# Patient Record
Sex: FEMALE | Race: WHITE | Employment: FULL TIME | URBAN - METROPOLITAN AREA
[De-identification: names, ages, dates, MRNs, and addresses within clinical notes are randomized per-mention and may not be internally consistent; named-entity substitution may affect disease eponyms.]

---

## 2022-01-04 ENCOUNTER — OFFICE VISIT (OUTPATIENT)
Dept: FAMILY MEDICINE CLINIC | Age: 27
End: 2022-01-04
Payer: MEDICARE

## 2022-01-04 ENCOUNTER — HOSPITAL ENCOUNTER (OUTPATIENT)
Age: 27
Discharge: HOME | End: 2022-01-04
Payer: MEDICARE

## 2022-01-04 VITALS
DIASTOLIC BLOOD PRESSURE: 74 MMHG | RESPIRATION RATE: 16 BRPM | HEIGHT: 64 IN | BODY MASS INDEX: 31.58 KG/M2 | TEMPERATURE: 97.9 F | HEART RATE: 77 BPM | SYSTOLIC BLOOD PRESSURE: 116 MMHG | WEIGHT: 185 LBS | OXYGEN SATURATION: 96 %

## 2022-01-04 DIAGNOSIS — Z00.00 ROUTINE PHYSICAL EXAMINATION: ICD-10-CM

## 2022-01-04 DIAGNOSIS — R53.83 FATIGUE, UNSPECIFIED TYPE: ICD-10-CM

## 2022-01-04 DIAGNOSIS — E55.9 VITAMIN D DEFICIENCY: ICD-10-CM

## 2022-01-04 DIAGNOSIS — Z23 FLU VACCINE NEED: ICD-10-CM

## 2022-01-04 DIAGNOSIS — Z59.9 FINANCIAL DIFFICULTIES: Primary | ICD-10-CM

## 2022-01-04 LAB
ALBUMIN SERPL-MCNC: 4.2 G/DL (ref 3.5–5.2)
ALP BLD-CCNC: 67 U/L (ref 35–104)
ALT SERPL-CCNC: 13 U/L (ref 0–32)
ANION GAP SERPL CALCULATED.3IONS-SCNC: 14 MMOL/L (ref 7–16)
AST SERPL-CCNC: 11 U/L (ref 0–31)
BASOPHILS ABSOLUTE: 0.06 E9/L (ref 0–0.2)
BASOPHILS RELATIVE PERCENT: 0.9 % (ref 0–2)
BILIRUB SERPL-MCNC: 0.6 MG/DL (ref 0–1.2)
BUN BLDV-MCNC: 13 MG/DL (ref 6–20)
CALCIUM SERPL-MCNC: 9.7 MG/DL (ref 8.6–10.2)
CHLORIDE BLD-SCNC: 104 MMOL/L (ref 98–107)
CHOLESTEROL, TOTAL: 187 MG/DL (ref 0–199)
CO2: 23 MMOL/L (ref 22–29)
CREAT SERPL-MCNC: 0.7 MG/DL (ref 0.5–1)
EOSINOPHILS ABSOLUTE: 0.78 E9/L (ref 0.05–0.5)
EOSINOPHILS RELATIVE PERCENT: 11.1 % (ref 0–6)
GFR AFRICAN AMERICAN: >60
GFR NON-AFRICAN AMERICAN: >60 ML/MIN/1.73
GLUCOSE BLD-MCNC: 86 MG/DL (ref 74–99)
HBA1C MFR BLD: 5.3 % (ref 4–5.6)
HCT VFR BLD CALC: 40.4 % (ref 34–48)
HDLC SERPL-MCNC: 50 MG/DL
HEMOGLOBIN: 13.2 G/DL (ref 11.5–15.5)
IMMATURE GRANULOCYTES #: 0.02 E9/L
IMMATURE GRANULOCYTES %: 0.3 % (ref 0–5)
LDL CHOLESTEROL CALCULATED: 107 MG/DL (ref 0–99)
LYMPHOCYTES ABSOLUTE: 2.19 E9/L (ref 1.5–4)
LYMPHOCYTES RELATIVE PERCENT: 31.1 % (ref 20–42)
MCH RBC QN AUTO: 28 PG (ref 26–35)
MCHC RBC AUTO-ENTMCNC: 32.7 % (ref 32–34.5)
MCV RBC AUTO: 85.8 FL (ref 80–99.9)
MONOCYTES ABSOLUTE: 0.65 E9/L (ref 0.1–0.95)
MONOCYTES RELATIVE PERCENT: 9.2 % (ref 2–12)
NEUTROPHILS ABSOLUTE: 3.35 E9/L (ref 1.8–7.3)
NEUTROPHILS RELATIVE PERCENT: 47.4 % (ref 43–80)
PDW BLD-RTO: 13.7 FL (ref 11.5–15)
PLATELET # BLD: 340 E9/L (ref 130–450)
PMV BLD AUTO: 10.4 FL (ref 7–12)
POTASSIUM SERPL-SCNC: 4.4 MMOL/L (ref 3.5–5)
RBC # BLD: 4.71 E12/L (ref 3.5–5.5)
SODIUM BLD-SCNC: 141 MMOL/L (ref 132–146)
TOTAL PROTEIN: 7.6 G/DL (ref 6.4–8.3)
TRIGL SERPL-MCNC: 149 MG/DL (ref 0–149)
TSH SERPL DL<=0.05 MIU/L-ACNC: 1.37 UIU/ML (ref 0.27–4.2)
VITAMIN D 25-HYDROXY: 15 NG/ML (ref 30–100)
VLDLC SERPL CALC-MCNC: 30 MG/DL
WBC # BLD: 7.1 E9/L (ref 4.5–11.5)

## 2022-01-04 PROCEDURE — 83036 HEMOGLOBIN GLYCOSYLATED A1C: CPT

## 2022-01-04 PROCEDURE — 80053 COMPREHEN METABOLIC PANEL: CPT

## 2022-01-04 PROCEDURE — 82306 VITAMIN D 25 HYDROXY: CPT

## 2022-01-04 PROCEDURE — 99203 OFFICE O/P NEW LOW 30 MIN: CPT | Performed by: STUDENT IN AN ORGANIZED HEALTH CARE EDUCATION/TRAINING PROGRAM

## 2022-01-04 PROCEDURE — 90674 CCIIV4 VAC NO PRSV 0.5 ML IM: CPT | Performed by: STUDENT IN AN ORGANIZED HEALTH CARE EDUCATION/TRAINING PROGRAM

## 2022-01-04 PROCEDURE — 80061 LIPID PANEL: CPT

## 2022-01-04 PROCEDURE — 85025 COMPLETE CBC W/AUTO DIFF WBC: CPT

## 2022-01-04 PROCEDURE — 84443 ASSAY THYROID STIM HORMONE: CPT

## 2022-01-04 PROCEDURE — 36415 COLL VENOUS BLD VENIPUNCTURE: CPT

## 2022-01-04 PROCEDURE — G0008 ADMIN INFLUENZA VIRUS VAC: HCPCS | Performed by: STUDENT IN AN ORGANIZED HEALTH CARE EDUCATION/TRAINING PROGRAM

## 2022-01-04 SDOH — ECONOMIC STABILITY - INCOME SECURITY: PROBLEM RELATED TO HOUSING AND ECONOMIC CIRCUMSTANCES, UNSPECIFIED: Z59.9

## 2022-01-04 SDOH — ECONOMIC STABILITY: FOOD INSECURITY: WITHIN THE PAST 12 MONTHS, YOU WORRIED THAT YOUR FOOD WOULD RUN OUT BEFORE YOU GOT MONEY TO BUY MORE.: NEVER TRUE

## 2022-01-04 SDOH — ECONOMIC STABILITY: FOOD INSECURITY: WITHIN THE PAST 12 MONTHS, THE FOOD YOU BOUGHT JUST DIDN'T LAST AND YOU DIDN'T HAVE MONEY TO GET MORE.: NEVER TRUE

## 2022-01-04 ASSESSMENT — ENCOUNTER SYMPTOMS
NAUSEA: 0
DIARRHEA: 0
SHORTNESS OF BREATH: 0
CONSTIPATION: 0
EYE PAIN: 0
BACK PAIN: 1
COUGH: 0
RHINORRHEA: 0
SORE THROAT: 0
ABDOMINAL PAIN: 0
VOMITING: 0
EYE REDNESS: 0
SINUS PRESSURE: 0
SINUS PAIN: 0

## 2022-01-04 ASSESSMENT — PATIENT HEALTH QUESTIONNAIRE - PHQ9
SUM OF ALL RESPONSES TO PHQ QUESTIONS 1-9: 12
1. LITTLE INTEREST OR PLEASURE IN DOING THINGS: 1
7. TROUBLE CONCENTRATING ON THINGS, SUCH AS READING THE NEWSPAPER OR WATCHING TELEVISION: 0
SUM OF ALL RESPONSES TO PHQ QUESTIONS 1-9: 12
SUM OF ALL RESPONSES TO PHQ9 QUESTIONS 1 & 2: 3
SUM OF ALL RESPONSES TO PHQ QUESTIONS 1-9: 12
4. FEELING TIRED OR HAVING LITTLE ENERGY: 3
8. MOVING OR SPEAKING SO SLOWLY THAT OTHER PEOPLE COULD HAVE NOTICED. OR THE OPPOSITE, BEING SO FIGETY OR RESTLESS THAT YOU HAVE BEEN MOVING AROUND A LOT MORE THAN USUAL: 0
6. FEELING BAD ABOUT YOURSELF - OR THAT YOU ARE A FAILURE OR HAVE LET YOURSELF OR YOUR FAMILY DOWN: 3
2. FEELING DOWN, DEPRESSED OR HOPELESS: 2
5. POOR APPETITE OR OVEREATING: 3
10. IF YOU CHECKED OFF ANY PROBLEMS, HOW DIFFICULT HAVE THESE PROBLEMS MADE IT FOR YOU TO DO YOUR WORK, TAKE CARE OF THINGS AT HOME, OR GET ALONG WITH OTHER PEOPLE: 1
9. THOUGHTS THAT YOU WOULD BE BETTER OFF DEAD, OR OF HURTING YOURSELF: 0
3. TROUBLE FALLING OR STAYING ASLEEP: 0
SUM OF ALL RESPONSES TO PHQ QUESTIONS 1-9: 12

## 2022-01-04 ASSESSMENT — SOCIAL DETERMINANTS OF HEALTH (SDOH): HOW HARD IS IT FOR YOU TO PAY FOR THE VERY BASICS LIKE FOOD, HOUSING, MEDICAL CARE, AND HEATING?: VERY HARD

## 2022-01-04 NOTE — PROGRESS NOTES
Vaccine Information Sheet, \"Influenza - Inactivated\"  given to Enoc Arroyo, or parent/legal guardian of  Enoc Arroyo and verbalized understanding. Patient responses:    Have you ever had a reaction to a flu vaccine? No  Are you able to eat eggs without adverse effects? Yes  Do you have any current illness? No  Have you ever had Guillian Fisher Syndrome? No    Flu vaccine given per order. Please see immunization tab.

## 2022-01-04 NOTE — PROGRESS NOTES
2022    Kim Clemens (:  1995) is a 23 y.o. 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 Patient is a 72-year-old female here today to establish care with myself. 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 She has a surgical history of a cyst removal from her right breast.  She states it was benign. She states she also had some eye surgery done. She states she was on Celexa in the past for her depression but she declines starting any medications at this time. She states she did not like how it made her feel. However she would like to follow-up with me in a couple weeks to see how she is doing and see if she wants to start anything at that time. Patient does have a 12month-old. Her parents kicked her out of her house for this. She states that they told her that she was responsible. However when she first got pregnant they were more willing to take care of her and the baby. However they stated that since she was unresponsible they wanted custody of the baby. They told her that if they can have the custody then she needed to move out. Says she was homeless for about 2 or 3 weeks. Now she is living in an apartment that her friend has paid 3 months for her. 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 Patient denies CP, SOB, nausea, vomiting, diarrhea, fevers chills sweats, abdominal pain, numbness tingling, dizziness, lightheadedness, ear pain, eye pain, nasal congestion, headaches, blurry vision.      Review of Systems   Constitutional: Negative for chills, fatigue and fever.   HENT: Negative for congestion, ear pain, postnasal drip, rhinorrhea, sinus pressure, sinus pain and sore throat. Eyes: Negative for pain and redness. Respiratory: Negative for cough and shortness of breath. Cardiovascular: Negative for chest pain. Gastrointestinal: Negative for abdominal pain, constipation, diarrhea, nausea and vomiting. Genitourinary: Negative for difficulty urinating and dysuria. Musculoskeletal: Positive for back pain (from epidural patient believes). Negative for myalgias and neck pain. Skin: Negative for rash. Neurological: Negative for dizziness, light-headedness and numbness. Psychiatric/Behavioral: The patient is not nervous/anxious. Prior to Visit Medications    Not on File        No Known Allergies    Past Medical History:   Diagnosis Date    Depression        Past Surgical History:   Procedure Laterality Date    CYST REMOVAL Right 2016    removed from right breast, benign    EYE SURGERY         Social History     Socioeconomic History    Marital status: Single     Spouse name: Not on file    Number of children: Not on file    Years of education: Not on file    Highest education level: Not on file   Occupational History    Not on file   Tobacco Use    Smoking status: Never Smoker    Smokeless tobacco: Never Used   Substance and Sexual Activity    Alcohol use: Yes     Comment: occasionally    Drug use: Never    Sexual activity: Not on file   Other Topics Concern    Not on file   Social History Narrative    Not on file     Social Determinants of Health     Financial Resource Strain: High Risk    Difficulty of Paying Living Expenses: Very hard   Food Insecurity: No Food Insecurity    Worried About Running Out of Food in the Last Year: Never true    Mikie of Food in the Last Year: Never true   Transportation Needs:     Lack of Transportation (Medical): Not on file    Lack of Transportation (Non-Medical):  Not on file   Physical Activity:  Days of Exercise per Week: Not on file    Minutes of Exercise per Session: Not on file   Stress:     Feeling of Stress : Not on file   Social Connections:     Frequency of Communication with Friends and Family: Not on file    Frequency of Social Gatherings with Friends and Family: Not on file    Attends Judaism Services: Not on file    Active Member of [address] [place] [address] [employer] or Organizations: Not on file    Attends Club or Organization Meetings: Not on file    Marital Status: Not on file   Intimate Partner Violence:     Fear of Current or Ex-Partner: Not on file    Emotionally Abused: Not on file    Physically Abused: Not on file    Sexually Abused: Not on file   Housing Stability:     Unable to Pay for Housing in the Last Year: Not on file    Number of [place] in the Last Year: Not on file    Unstable Housing in the Last Year: Not on file        Family History   Problem Relation Age of Onset    Heart Disease Mother     Sleep Apnea Father     Breast Cancer Maternal Grandmother     Cancer Paternal Grandfather            Vitals:    01/04/22 1242   BP: 116/74   Site: Left Upper Arm   Pulse: 77   Resp: 16   Temp: 97.9 °F (36.6 °C)   TempSrc: Temporal   SpO2: 96%   Weight: 185 lb (83.9 kg)   Height: 5' 4.25\" (1.632 m)     Estimated body mass index is 31.51 kg/m² as calculated from the following:    Height as of this encounter: 5' 4.25\" (1.632 m). Weight as of this encounter: 185 lb (83.9 kg). Most Recent Labs  TSH  Lab Results   Component Value Date    TSH 1.424 06/05/2012         Physical Exam  Vitals and nursing note reviewed. Constitutional:       Appearance: Normal appearance. HENT:      Head: Normocephalic and atraumatic. Right Ear: Tympanic membrane, ear canal and external ear normal.      Left Ear: Tympanic membrane, ear canal and external ear normal.      Nose: Nose normal.      Mouth/Throat:      Mouth: Mucous membranes are moist.      Pharynx: Oropharynx is clear.    Eyes: Extraocular Movements: Extraocular movements intact. Conjunctiva/sclera: Conjunctivae normal.      Pupils: Pupils are equal, round, and reactive to light. Cardiovascular:      Rate and Rhythm: Normal rate and regular rhythm. Pulses: Normal pulses. Heart sounds: Normal heart sounds. Pulmonary:      Effort: Pulmonary effort is normal.      Breath sounds: Normal breath sounds. Abdominal:      General: Bowel sounds are normal.      Palpations: Abdomen is soft. Musculoskeletal:         General: Normal range of motion. Cervical back: Normal range of motion and neck supple. Skin:     General: Skin is warm and dry. Capillary Refill: Capillary refill takes less than 2 seconds. Neurological:      General: No focal deficit present. Mental Status: She is alert and oriented to person, place, and time. Psychiatric:         Mood and Affect: Mood normal.         Behavior: Behavior normal.         Thought Content: Thought content normal.         ASSESSMENT/PLAN:  Javier Sorenson was seen today for establish care and annual exam.    Diagnoses and all orders for this visit:    Financial difficulties  -     [facility] Referral to Social Work (Primary Care Only)    Flu vaccine need  -     INFLUENZA, MDCK QUADV, 2 YRS AND OLDER, IM, PF, PREFILL SYR OR SDV, 0.5ML (FLUCELVAX QUADV, PF)    Routine physical examination  -     CBC Auto Differential; Future  -     Comprehensive Metabolic Panel; Future    Vitamin D deficiency  -     Vitamin D 25 Hydroxy; Future    BMI 31.0-31.9,adult  -     Hemoglobin A1C; Future  -     Lipid Panel; Future    Fatigue, unspecified type  -     Hemoglobin A1C; Future  -     TSH without Reflex; Future         As above. Call or go to the nearest ED immediately if symptoms worsen or persist.  Educational materials and/or home exercises printed for patient's review and were included in patient instructions on his/her After Visit Summary and given to patient at the end of visit. Counseled regarding above diagnosis, including possible risks and complications,  especially if left uncontrolled. Counseled regarding the possible side effects, risks, benefits and alternatives to treatment; patient and/or guardian verbalizes understanding, agrees, feels comfortable with and wishes to proceed with above treatment plan. Advised patient to call with any new medication issues, and read all Rx info from pharmacy to assure aware of all possible risks and side effects of medication before taking. Reviewed age and gender appropriate health screening exams and vaccinations. Advised patient regarding importance of keeping up with recommended health maintenance and to schedule as soon as possible if overdue, as this is important in assessing for undiagnosed pathology, especially cancer, as well as protecting against potentially harmful/life threatening disease. Patient and/or guardian verbalizes understanding and agrees with above counseling, assessment and plan. All questions answered. Return in about 4 weeks (around 2/1/2022) for depression. An  electronic signature was used to authenticate this note.     --Janki Laureano,  on 1/4/2022 at 1:13 PM

## 2022-01-05 DIAGNOSIS — E55.9 VITAMIN D DEFICIENCY: Primary | ICD-10-CM

## 2022-01-05 RX ORDER — ERGOCALCIFEROL 1.25 MG/1
50000 CAPSULE ORAL WEEKLY
Qty: 12 CAPSULE | Refills: 1 | Status: SHIPPED | OUTPATIENT
Start: 2022-01-05

## 2022-02-03 ENCOUNTER — TELEPHONE (OUTPATIENT)
Dept: FAMILY MEDICINE CLINIC | Age: 27
End: 2022-02-03

## 2022-02-03 NOTE — TELEPHONE ENCOUNTER
LSW attempted follow up phone call to patient regarding positive SDOH screen for financial difficulties and depression. Left message to return call. Noted previously scheduled 2/2/22 appt for follow up was canceled by patient due to feeling better/no longer needed appointment.

## 2022-04-26 PROCEDURE — 99285 EMERGENCY DEPT VISIT HI MDM: CPT

## 2022-04-26 PROCEDURE — 96375 TX/PRO/DX INJ NEW DRUG ADDON: CPT

## 2022-04-26 PROCEDURE — 96374 THER/PROPH/DIAG INJ IV PUSH: CPT

## 2022-04-27 ENCOUNTER — APPOINTMENT (OUTPATIENT)
Dept: CT IMAGING | Age: 27
End: 2022-04-27
Payer: COMMERCIAL

## 2022-04-27 ENCOUNTER — HOSPITAL ENCOUNTER (EMERGENCY)
Age: 27
Discharge: HOME | End: 2022-04-27
Attending: EMERGENCY MEDICINE
Payer: COMMERCIAL

## 2022-04-27 VITALS
TEMPERATURE: 97.6 F | OXYGEN SATURATION: 99 % | WEIGHT: 185 LBS | SYSTOLIC BLOOD PRESSURE: 110 MMHG | DIASTOLIC BLOOD PRESSURE: 68 MMHG | HEIGHT: 64 IN | BODY MASS INDEX: 31.58 KG/M2 | HEART RATE: 69 BPM | RESPIRATION RATE: 16 BRPM

## 2022-04-27 DIAGNOSIS — R11.2 NON-INTRACTABLE VOMITING WITH NAUSEA, UNSPECIFIED VOMITING TYPE: ICD-10-CM

## 2022-04-27 DIAGNOSIS — R10.84 GENERALIZED ABDOMINAL PAIN: Primary | ICD-10-CM

## 2022-04-27 DIAGNOSIS — R19.7 DIARRHEA, UNSPECIFIED TYPE: ICD-10-CM

## 2022-04-27 LAB
ALBUMIN SERPL-MCNC: 4.1 G/DL (ref 3.5–5.2)
ALP BLD-CCNC: 66 U/L (ref 35–104)
ALT SERPL-CCNC: 12 U/L (ref 0–32)
ANION GAP SERPL CALCULATED.3IONS-SCNC: 13 MMOL/L (ref 7–16)
AST SERPL-CCNC: 14 U/L (ref 0–31)
BACTERIA: ABNORMAL /HPF
BASOPHILS ABSOLUTE: 0.01 E9/L (ref 0–0.2)
BASOPHILS RELATIVE PERCENT: 0.1 % (ref 0–2)
BILIRUB SERPL-MCNC: 1.2 MG/DL (ref 0–1.2)
BILIRUBIN DIRECT: 0.2 MG/DL (ref 0–0.3)
BILIRUBIN URINE: NEGATIVE
BILIRUBIN, INDIRECT: 1 MG/DL (ref 0–1)
BLOOD, URINE: ABNORMAL
BUN BLDV-MCNC: 15 MG/DL (ref 6–20)
CALCIUM SERPL-MCNC: 8.7 MG/DL (ref 8.6–10.2)
CHLORIDE BLD-SCNC: 102 MMOL/L (ref 98–107)
CLARITY: CLEAR
CO2: 19 MMOL/L (ref 22–29)
COLOR: YELLOW
CREAT SERPL-MCNC: 0.6 MG/DL (ref 0.5–1)
EKG ATRIAL RATE: 72 BPM
EKG P AXIS: 52 DEGREES
EKG P-R INTERVAL: 168 MS
EKG Q-T INTERVAL: 392 MS
EKG QRS DURATION: 82 MS
EKG QTC CALCULATION (BAZETT): 429 MS
EKG R AXIS: 39 DEGREES
EKG T AXIS: 34 DEGREES
EKG VENTRICULAR RATE: 72 BPM
EOSINOPHILS ABSOLUTE: 0.29 E9/L (ref 0.05–0.5)
EOSINOPHILS RELATIVE PERCENT: 2.4 % (ref 0–6)
EPITHELIAL CELLS, UA: ABNORMAL /HPF
GFR AFRICAN AMERICAN: >60
GFR NON-AFRICAN AMERICAN: >60 ML/MIN/1.73
GLUCOSE BLD-MCNC: 105 MG/DL (ref 74–99)
GLUCOSE URINE: NEGATIVE MG/DL
HCG, URINE, POC: NEGATIVE
HCT VFR BLD CALC: 42.8 % (ref 34–48)
HEMOGLOBIN: 14.1 G/DL (ref 11.5–15.5)
IMMATURE GRANULOCYTES #: 0.04 E9/L
IMMATURE GRANULOCYTES %: 0.3 % (ref 0–5)
INFLUENZA A BY PCR: NOT DETECTED
INFLUENZA B BY PCR: NOT DETECTED
KETONES, URINE: ABNORMAL MG/DL
LEUKOCYTE ESTERASE, URINE: NEGATIVE
LIPASE: 25 U/L (ref 13–60)
LYMPHOCYTES ABSOLUTE: 0.62 E9/L (ref 1.5–4)
LYMPHOCYTES RELATIVE PERCENT: 5.1 % (ref 20–42)
Lab: NORMAL
MCH RBC QN AUTO: 28.4 PG (ref 26–35)
MCHC RBC AUTO-ENTMCNC: 32.9 % (ref 32–34.5)
MCV RBC AUTO: 86.3 FL (ref 80–99.9)
MONOCYTES ABSOLUTE: 0.48 E9/L (ref 0.1–0.95)
MONOCYTES RELATIVE PERCENT: 3.9 % (ref 2–12)
NEGATIVE QC PASS/FAIL: NORMAL
NEUTROPHILS ABSOLUTE: 10.8 E9/L (ref 1.8–7.3)
NEUTROPHILS RELATIVE PERCENT: 88.2 % (ref 43–80)
NITRITE, URINE: NEGATIVE
PDW BLD-RTO: 13.3 FL (ref 11.5–15)
PH UA: 5 (ref 5–9)
PLATELET # BLD: 334 E9/L (ref 130–450)
PMV BLD AUTO: 10.1 FL (ref 7–12)
POSITIVE QC PASS/FAIL: NORMAL
POTASSIUM REFLEX MAGNESIUM: 3.9 MMOL/L (ref 3.5–5)
PROTEIN UA: NEGATIVE MG/DL
RBC # BLD: 4.96 E12/L (ref 3.5–5.5)
RBC UA: >20 /HPF (ref 0–2)
SARS-COV-2, NAAT: NOT DETECTED
SODIUM BLD-SCNC: 134 MMOL/L (ref 132–146)
SPECIFIC GRAVITY UA: 1.01 (ref 1–1.03)
TOTAL PROTEIN: 7.6 G/DL (ref 6.4–8.3)
UROBILINOGEN, URINE: 0.2 E.U./DL
WBC # BLD: 12.2 E9/L (ref 4.5–11.5)
WBC UA: ABNORMAL /HPF (ref 0–5)

## 2022-04-27 PROCEDURE — 80048 BASIC METABOLIC PNL TOTAL CA: CPT

## 2022-04-27 PROCEDURE — 87088 URINE BACTERIA CULTURE: CPT

## 2022-04-27 PROCEDURE — 81001 URINALYSIS AUTO W/SCOPE: CPT

## 2022-04-27 PROCEDURE — 74177 CT ABD & PELVIS W/CONTRAST: CPT

## 2022-04-27 PROCEDURE — 6370000000 HC RX 637 (ALT 250 FOR IP): Performed by: STUDENT IN AN ORGANIZED HEALTH CARE EDUCATION/TRAINING PROGRAM

## 2022-04-27 PROCEDURE — 80076 HEPATIC FUNCTION PANEL: CPT

## 2022-04-27 PROCEDURE — 6360000004 HC RX CONTRAST MEDICATION: Performed by: RADIOLOGY

## 2022-04-27 PROCEDURE — 96374 THER/PROPH/DIAG INJ IV PUSH: CPT

## 2022-04-27 PROCEDURE — 85025 COMPLETE CBC W/AUTO DIFF WBC: CPT

## 2022-04-27 PROCEDURE — 2580000003 HC RX 258: Performed by: STUDENT IN AN ORGANIZED HEALTH CARE EDUCATION/TRAINING PROGRAM

## 2022-04-27 PROCEDURE — 83690 ASSAY OF LIPASE: CPT

## 2022-04-27 PROCEDURE — 93005 ELECTROCARDIOGRAM TRACING: CPT | Performed by: STUDENT IN AN ORGANIZED HEALTH CARE EDUCATION/TRAINING PROGRAM

## 2022-04-27 PROCEDURE — 36415 COLL VENOUS BLD VENIPUNCTURE: CPT

## 2022-04-27 PROCEDURE — 87635 SARS-COV-2 COVID-19 AMP PRB: CPT

## 2022-04-27 PROCEDURE — 6360000002 HC RX W HCPCS: Performed by: STUDENT IN AN ORGANIZED HEALTH CARE EDUCATION/TRAINING PROGRAM

## 2022-04-27 PROCEDURE — 87502 INFLUENZA DNA AMP PROBE: CPT

## 2022-04-27 PROCEDURE — 96375 TX/PRO/DX INJ NEW DRUG ADDON: CPT

## 2022-04-27 RX ORDER — FENTANYL CITRATE 0.05 MG/ML
50 INJECTION, SOLUTION INTRAMUSCULAR; INTRAVENOUS ONCE
Status: COMPLETED | OUTPATIENT
Start: 2022-04-27 | End: 2022-04-27

## 2022-04-27 RX ORDER — ONDANSETRON 2 MG/ML
4 INJECTION INTRAMUSCULAR; INTRAVENOUS ONCE
Status: COMPLETED | OUTPATIENT
Start: 2022-04-27 | End: 2022-04-27

## 2022-04-27 RX ORDER — 0.9 % SODIUM CHLORIDE 0.9 %
1000 INTRAVENOUS SOLUTION INTRAVENOUS ONCE
Status: COMPLETED | OUTPATIENT
Start: 2022-04-27 | End: 2022-04-27

## 2022-04-27 RX ORDER — AMOXICILLIN AND CLAVULANATE POTASSIUM 875; 125 MG/1; MG/1
1 TABLET, FILM COATED ORAL 2 TIMES DAILY
Qty: 20 TABLET | Refills: 0 | Status: SHIPPED | OUTPATIENT
Start: 2022-04-27 | End: 2022-05-07

## 2022-04-27 RX ORDER — AMOXICILLIN AND CLAVULANATE POTASSIUM 875; 125 MG/1; MG/1
1 TABLET, FILM COATED ORAL ONCE
Status: COMPLETED | OUTPATIENT
Start: 2022-04-27 | End: 2022-04-27

## 2022-04-27 RX ORDER — ONDANSETRON 4 MG/1
4 TABLET, ORALLY DISINTEGRATING ORAL 3 TIMES DAILY PRN
Qty: 21 TABLET | Refills: 0 | Status: SHIPPED | OUTPATIENT
Start: 2022-04-27

## 2022-04-27 RX ADMIN — IOPAMIDOL 80 ML: 755 INJECTION, SOLUTION INTRAVENOUS at 03:43

## 2022-04-27 RX ADMIN — ONDANSETRON 4 MG: 2 INJECTION INTRAMUSCULAR; INTRAVENOUS at 02:42

## 2022-04-27 RX ADMIN — SODIUM CHLORIDE 1000 ML: 9 INJECTION, SOLUTION INTRAVENOUS at 02:41

## 2022-04-27 RX ADMIN — FENTANYL CITRATE 50 MCG: 50 INJECTION INTRAMUSCULAR; INTRAVENOUS at 02:42

## 2022-04-27 RX ADMIN — AMOXICILLIN AND CLAVULANATE POTASSIUM 1 TABLET: 875; 125 TABLET, FILM COATED ORAL at 04:21

## 2022-04-27 ASSESSMENT — ENCOUNTER SYMPTOMS
VOMITING: 1
COUGH: 0
WHEEZING: 0
BACK PAIN: 0
SINUS PRESSURE: 0
ABDOMINAL PAIN: 1
EYE PAIN: 0
ABDOMINAL DISTENTION: 0
EYE DISCHARGE: 0
NAUSEA: 1
SHORTNESS OF BREATH: 0
DIARRHEA: 1
SORE THROAT: 0
EYE REDNESS: 0

## 2022-04-27 ASSESSMENT — PAIN DESCRIPTION - LOCATION
LOCATION: ABDOMEN
LOCATION: ABDOMEN

## 2022-04-27 ASSESSMENT — PAIN SCALES - GENERAL
PAINLEVEL_OUTOF10: 3
PAINLEVEL_OUTOF10: 6

## 2022-04-27 NOTE — ED PROVIDER NOTES
The history is provided by the patient and medical records. 81-year-old female presents emergency department complaint of nausea vomiting diarrhea and abdominal pain. Been going on for approximately 3 days. Is having hard time keeping anything down. Has been taking Zofran last dose was yesterday at 11 AM.  She states she would continue nausea vomiting diarrhea and belly pain trouble to come into the get further work-up and evaluation denies any fevers chills cough congestion runny nose denies any chest pain or shortness of breath denies any changes to urination. Otherwise no other acute complaints    The patient presents with abd pain that has been going on for 3 days. These symptoms are moderate in severity. Symptoms are made better by nothign. Symptoms are made worse by nothign. Associated symptoms include nausea vomiting diarrhea. Review of Systems   Constitutional: Negative for chills and fever. HENT: Negative for ear pain, sinus pressure and sore throat. Eyes: Negative for pain, discharge and redness. Respiratory: Negative for cough, shortness of breath and wheezing. Cardiovascular: Negative for chest pain. Gastrointestinal: Positive for abdominal pain, diarrhea, nausea and vomiting. Negative for abdominal distention. Genitourinary: Negative for dysuria and frequency. Musculoskeletal: Negative for arthralgias and back pain. Skin: Negative for rash and wound. Neurological: Negative for weakness and headaches. Hematological: Negative for adenopathy. All other systems reviewed and are negative. Physical Exam  Vitals and nursing note reviewed. Constitutional:       General: She is not in acute distress. Appearance: Normal appearance. She is normal weight. She is not toxic-appearing. HENT:      Head: Normocephalic and atraumatic. Eyes:      Conjunctiva/sclera: Conjunctivae normal.   Cardiovascular:      Rate and Rhythm: Normal rate and regular rhythm.       Pulses: Normal pulses. Heart sounds: Normal heart sounds. No murmur heard. No gallop. Pulmonary:      Effort: Pulmonary effort is normal. No respiratory distress. Breath sounds: Normal breath sounds. No wheezing or rales. Abdominal:      General: Abdomen is flat. Bowel sounds are normal. There is no distension. Palpations: Abdomen is soft. Tenderness: There is generalized abdominal tenderness. There is no guarding. Skin:     General: Skin is warm and dry. Capillary Refill: Capillary refill takes less than 2 seconds. Coloration: Skin is not jaundiced. Neurological:      General: No focal deficit present. Mental Status: She is alert and oriented to person, place, and time. Psychiatric:         Mood and Affect: Mood normal.         Thought Content: Thought content normal.          Procedures     MDM     60-year-old female presents emerged department with complaints of diarrhea and abdominal pain. Patient's laboratory work-up noted to have slight elevation of the white count otherwise unremarkable urinalysis unremarkable for any signs of infection. Patient's CAT scan showing signs of enteritis. Patient will be started on antibiotics for this. She was given 1 L of fluid as well as nausea medication. She is feeling better and safe to be discharged home at this time. She is agreeable with plan and follow-up with her family doctor. Patient safe for discharge from the emergency department. Did advise on return precautions to the emergency department. Did also advise follow-up with her primary care physician. Patient agreeable with the plan moving forward. EKG: This EKG is signed by emergency department physician. Rate: 72  Rhythm: Sinus  Interpretation: No acute ST elevation depression normal sinus rhythm normal axis intervals appropriate range.   Comparison: no previous EKG available              --------------------------------------------- PAST HISTORY ---------------------------------------------  Past Medical History:  has a past medical history of Depression. Past Surgical History:  has a past surgical history that includes cyst removal (Right, 2016) and eye surgery. Social History:  reports that she has never smoked. She has never used smokeless tobacco. She reports current alcohol use. She reports that she does not use drugs. Family History: family history includes Breast Cancer in her maternal grandmother; Cancer in her paternal grandfather; Heart Disease in her mother; Sleep Apnea in her father. The patients home medications have been reviewed. Allergies: Patient has no known allergies. -------------------------------------------------- RESULTS -------------------------------------------------  Labs:  Results for orders placed or performed during the hospital encounter of 04/27/22   RAPID INFLUENZA A/B ANTIGENS    Specimen: Nasopharyngeal   Result Value Ref Range    Influenza A by PCR Not Detected Not Detected    Influenza B by PCR Not Detected Not Detected   COVID-19, Rapid    Specimen: Nasopharyngeal Swab   Result Value Ref Range    SARS-CoV-2, NAAT Not Detected Not Detected   Culture, Urine    Specimen: Urine, clean catch   Result Value Ref Range    Urine Culture, Routine       10 to 100,000 CFU/mL  Mixed michael isolated. Further workup and sensitivity testing  is not routinely indicated and will not be performed.   Mixed michael isolated includes:  Gram negative rods  Mixed gram positive organisms     CBC with Auto Differential   Result Value Ref Range    WBC 12.2 (H) 4.5 - 11.5 E9/L    RBC 4.96 3.50 - 5.50 E12/L    Hemoglobin 14.1 11.5 - 15.5 g/dL    Hematocrit 42.8 34.0 - 48.0 %    MCV 86.3 80.0 - 99.9 fL    MCH 28.4 26.0 - 35.0 pg    MCHC 32.9 32.0 - 34.5 %    RDW 13.3 11.5 - 15.0 fL    Platelets 771 354 - 661 E9/L    MPV 10.1 7.0 - 12.0 fL    Neutrophils % 88.2 (H) 43.0 - 80.0 %    Immature Granulocytes % 0.3 0.0 - 5.0 % Lymphocytes % 5.1 (L) 20.0 - 42.0 %    Monocytes % 3.9 2.0 - 12.0 %    Eosinophils % 2.4 0.0 - 6.0 %    Basophils % 0.1 0.0 - 2.0 %    Neutrophils Absolute 10.80 (H) 1.80 - 7.30 E9/L    Immature Granulocytes # 0.04 E9/L    Lymphocytes Absolute 0.62 (L) 1.50 - 4.00 E9/L    Monocytes Absolute 0.48 0.10 - 0.95 E9/L    Eosinophils Absolute 0.29 0.05 - 0.50 E9/L    Basophils Absolute 0.01 0.00 - 0.20 L4/C   Basic Metabolic Panel w/ Reflex to MG   Result Value Ref Range    Sodium 134 132 - 146 mmol/L    Potassium reflex Magnesium 3.9 3.5 - 5.0 mmol/L    Chloride 102 98 - 107 mmol/L    CO2 19 (L) 22 - 29 mmol/L    Anion Gap 13 7 - 16 mmol/L    Glucose 105 (H) 74 - 99 mg/dL    BUN 15 6 - 20 mg/dL    CREATININE 0.6 0.5 - 1.0 mg/dL    GFR Non-African American >60 >=60 mL/min/1.73    GFR African American >60     Calcium 8.7 8.6 - 10.2 mg/dL   Hepatic Function Panel   Result Value Ref Range    Total Protein 7.6 6.4 - 8.3 g/dL    Albumin 4.1 3.5 - 5.2 g/dL    Alkaline Phosphatase 66 35 - 104 U/L    ALT 12 0 - 32 U/L    AST 14 0 - 31 U/L    Total Bilirubin 1.2 0.0 - 1.2 mg/dL    Bilirubin, Direct 0.2 0.0 - 0.3 mg/dL    Bilirubin, Indirect 1.0 0.0 - 1.0 mg/dL   Lipase   Result Value Ref Range    Lipase 25 13 - 60 U/L   Urinalysis with Microscopic   Result Value Ref Range    Color, UA Yellow Straw/Yellow    Clarity, UA Clear Clear    Glucose, Ur Negative Negative mg/dL    Bilirubin Urine Negative Negative    Ketones, Urine TRACE (A) Negative mg/dL    Specific Gravity, UA 1.015 1.005 - 1.030    Blood, Urine LARGE (A) Negative    pH, UA 5.0 5.0 - 9.0    Protein, UA Negative Negative mg/dL    Urobilinogen, Urine 0.2 <2.0 E.U./dL    Nitrite, Urine Negative Negative    Leukocyte Esterase, Urine Negative Negative    WBC, UA 0-1 0 - 5 /HPF    RBC, UA >20 0 - 2 /HPF    Epithelial Cells, UA RARE /HPF    Bacteria, UA RARE (A) None Seen /HPF   POC Pregnancy Urine   Result Value Ref Range    HCG, Urine, POC Negative Negative    Lot Number [id number]     Positive QC Pass/Fail Pass     Negative QC Pass/Fail Pass    EKG 12 Lead   Result Value Ref Range    Ventricular Rate 72 BPM    Atrial Rate 72 BPM    P-R Interval 168 ms    QRS Duration 82 ms    Q-T Interval 392 ms    QTc Calculation (Bazett) 429 ms    P Axis 52 degrees    R Axis 39 degrees    T Axis 34 degrees       Radiology:  CT ABDOMEN PELVIS W IV CONTRAST Additional Contrast? None   Final Result   Fluid distention of small bowel loops with some minimal areas of bowel   thickening likely related to enteritis. Mild thickening of the visualized esophagus which could be incidental or due   to esophagitis. No other significant acute abnormality identified. Short-term follow-up   recommended if symptoms persist.      RECOMMENDATIONS:   Unavailable             ------------------------- NURSING NOTES AND VITALS REVIEWED ---------------------------  Date / Time Roomed:  4/27/2022  1:29 AM  ED Bed Assignment:  03/03    The nursing notes within the ED encounter and vital signs as below have been reviewed. /68   Pulse 69   Temp 97.6 °F (36.4 °C) (Infrared)   Resp 16   Ht 5' 4\" (1.626 m)   Wt 185 lb (83.9 kg)   LMP 04/27/2022 (Exact Date)   SpO2 99%   BMI 31.76 kg/m²   Oxygen Saturation Interpretation: Normal      ------------------------------------------ PROGRESS NOTES ------------------------------------------  1:40 AM EDT  I have spoken with the patient and discussed todays results, in addition to providing specific details for the plan of care and counseling regarding the diagnosis and prognosis. Their questions are answered at this time and they are agreeable with the plan. I discussed at length with them reasons for immediate return here for re evaluation.  They will followup with their primary care physician by calling their office on Monday.      --------------------------------- ADDITIONAL PROVIDER NOTES ---------------------------------  At this time the patient is without objective evidence of an acute process requiring hospitalization or inpatient management. They have remained hemodynamically stable throughout their entire ED visit and are stable for discharge with outpatient follow-up. The plan has been discussed in detail and they are aware of the specific conditions for emergent return, as well as the importance of follow-up. Discharge Medication List as of 4/27/2022  5:00 AM      START taking these medications    Details   amoxicillin-clavulanate (AUGMENTIN) 875-125 MG per tablet Take 1 tablet by mouth 2 times daily for 10 days, Disp-20 tablet, R-0Normal             Diagnosis:  1. Generalized abdominal pain    2. Non-intractable vomiting with nausea, unspecified vomiting type    3. Diarrhea, unspecified type        Disposition:  Patient's disposition: Discharge to home  Patient's condition is stable. Avelina House MD  Resident  04/27/22 [id number]      ATTENDING PROVIDER ATTESTATION:     Re Shell presented to the emergency department for evaluation of No chief complaint on file. and was initially evaluated by the Medical Resident. See Original ED Note for H&P and ED course above. I have reviewed and discussed the case, including pertinent history (medical, surgical, family and social) and exam findings with the Medical Resident assigned to Buckyshawn Suleman. I have personally performed and/or participated in the history, exam, medical decision making, and procedures and agree with all pertinent clinical information. I have reviewed my findings and recommendations with the assigned Medical Resident, Re Shell and members of family present at the time of disposition. My findings/plan: The primary encounter diagnosis was Generalized abdominal pain. Diagnoses of Non-intractable vomiting with nausea, unspecified vomiting type and Diarrhea, unspecified type were also pertinent to this visit.   Discharge Medication List as of 4/27/2022 5:00 AM      START taking these medications    Details   amoxicillin-clavulanate (AUGMENTIN) 875-125 MG per tablet Take 1 tablet by mouth 2 times daily for 10 days, Disp-20 tablet, R-0Normal           DO Ky Sorto [place], [place]  06/08/22 0432

## 2022-04-28 LAB — URINE CULTURE, ROUTINE: NORMAL

## 2022-05-02 ENCOUNTER — TELEPHONE (OUTPATIENT)
Dept: FAMILY MEDICINE CLINIC | Age: 27
End: 2022-05-02

## 2022-05-02 NOTE — TELEPHONE ENCOUNTER
LSW attempted phone call to patient. Had noted patient was treated in ED 4/27/22 for enteritis with discharge instructions to schedule follow up with PCP.    Left message reminding patient to schedule ED follow up with PCP and to call LSW back regarding previous referral.

## 2022-05-19 ENCOUNTER — OFFICE VISIT (OUTPATIENT)
Dept: FAMILY MEDICINE CLINIC | Age: 27
End: 2022-05-19
Payer: COMMERCIAL

## 2022-05-19 VITALS
WEIGHT: 185 LBS | HEIGHT: 64 IN | DIASTOLIC BLOOD PRESSURE: 60 MMHG | BODY MASS INDEX: 31.58 KG/M2 | OXYGEN SATURATION: 98 % | RESPIRATION RATE: 16 BRPM | TEMPERATURE: 98.7 F | HEART RATE: 68 BPM | SYSTOLIC BLOOD PRESSURE: 102 MMHG

## 2022-05-19 DIAGNOSIS — R43.2 ALTERED TASTE: ICD-10-CM

## 2022-05-19 DIAGNOSIS — Z20.822 CLOSE EXPOSURE TO COVID-19 VIRUS: ICD-10-CM

## 2022-05-19 DIAGNOSIS — B34.9 VIRAL ILLNESS: Primary | ICD-10-CM

## 2022-05-19 DIAGNOSIS — R50.9 FEVER, UNSPECIFIED FEVER CAUSE: ICD-10-CM

## 2022-05-19 LAB
INFLUENZA A ANTIBODY: NORMAL
INFLUENZA B ANTIBODY: NORMAL
Lab: NORMAL
QC PASS/FAIL: NORMAL
SARS-COV-2 RDRP RESP QL NAA+PROBE: NEGATIVE

## 2022-05-19 PROCEDURE — 87635 SARS-COV-2 COVID-19 AMP PRB: CPT

## 2022-05-19 PROCEDURE — 87804 INFLUENZA ASSAY W/OPTIC: CPT

## 2022-05-19 PROCEDURE — 99213 OFFICE O/P EST LOW 20 MIN: CPT

## 2022-05-19 RX ORDER — METHYLPREDNISOLONE 4 MG/1
TABLET ORAL
Qty: 1 KIT | Refills: 0 | Status: SHIPPED
Start: 2022-05-19 | End: 2022-10-12 | Stop reason: ALTCHOICE

## 2022-05-19 NOTE — PROGRESS NOTES
Chief Complaint       Fever (Symptoms started yesterday. She took a home covid test but it come back inconclusive. ), Pharyngitis, Taste Change, Other (Had exposure to covid positive person at her school on Tuesday. ), and Headache    History of Present Illness   Source of history provided by:  patient. Mickey Starr is a 33 y.o. old female presenting to the walk in clinic for evaluation of fever (Tmax 103F), sore throat, altered taste and smell, headache, and cough x 1 day. She took at home test for COVID-19 that was \"inconclusive. \" Denies any chills, body aches, CP, dyspnea, LE edema, abdominal pain, vomiting, rash, or lethargy. Denies any hx of asthma or COPD. Patient has recent sick exposures, exposed to [place]- 19 at work 2 days ago. Patient has been vaccinated for COVID-19. Has been taking Tylenol OTC. ROS    Unless otherwise stated in this report or unable to obtain because of the patient's clinical or mental status as evidenced by the medical record, this patients's positive and negative responses for Review of Systems, constitutional, psych, eyes, ENT, cardiovascular, respiratory, gastrointestinal, neurological, genitourinary, musculoskeletal, integument systems and systems related to the presenting problem are either stated in the preceding or were not pertinent or were negative for the symptoms and/or complaints related to the medical problem. Physical Exam         VS:  /60   Pulse 68   Temp 98.7 °F (37.1 °C) (Temporal)   Resp 16   Ht 5' 4\" (1.626 m)   Wt 185 lb (83.9 kg)   LMP 04/27/2022 (Exact Date)   SpO2 98%   BMI 31.76 kg/m²    Oxygen Saturation Interpretation: Normal.    Constitutional:  Alert, development consistent with age. NAD. Head:  NC/NT. Airway patent. Mouth: Posterior pharynx with mild erythema and clear postnasal drip. No tonsillar hypertrophy or exudate. Neck:  Normal ROM. Supple. No anterior cervical adenopathy noted.   Lungs: CTAB without wheezes, rales, or rhonchi. CV:  Regular rate and rhythm, normal heart sounds, without pathological murmurs, ectopy, gallops, or rubs. Skin:  Normal turgor. Warm, dry, without visible rash. Lymphatic: No lymphangitis or adenopathy noted. Neurological:  Oriented. Motor functions intact. Lab / Imaging Results   (All laboratory and radiology results have been personally reviewed by myself)  Labs:  No results found for this visit on 05/19/22. Imaging: All Radiology results interpreted by Radiologist unless otherwise noted. Assessment / Plan     Impression(s):  Jorge Lacey was seen today for fever, pharyngitis, taste change, other and headache. Diagnoses and all orders for this visit:    Viral illness  -     methylPREDNISolone (MEDROL DOSEPACK) 4 MG tablet; Take by mouth. Fever, unspecified fever cause  -     POCT COVID-19 Rapid, NAAT  -     POCT Influenza A/B    Altered taste  -     POCT COVID-19 Rapid, NAAT    Close exposure to COVID-19 virus  -     POCT COVID-19 Rapid, NAAT      Disposition:  Disposition: Discharge to home. Rapid COVID-19 testing is negative in office. Increase fluids and rest. Symptomatic relief discussed including Tylenol prn pain/fever. Schedule f/u with PCP in 7-10 days if symptoms persist. ED sooner if symptoms worsen or change. ED immediately with high or refractory fever, progressive SOB, dyspnea, CP, calf pain/swelling, shaking chills, vomiting, abdominal pain, lethargy, flank pain, or decreased urinary output. Pt verbalizes understanding and is in agreement with plan of care. All questions answered. JENNIFFER Rios    **This report was transcribed using voice recognition software. Every effort was made to ensure accuracy; however, inadvertent computerized transcription errors may be present.

## 2022-05-19 NOTE — LETTER
[facility] In  [address] [place], [place] [id number]  Phone: [phone]  Fax: [phone]    Ashley Garcia, [address]        May 19, 2022     Patient: Aron Crain   YOB: 1995   Date of Visit: 5/19/2022       To Whom it May Concern:    Vale Downs was seen in my clinic on 5/19/2022. She may return to school on 5/23. If you have any questions or concerns, please don't hesitate to call.     Sincerely,         JENNIFFER Adkins

## 2022-07-27 ENCOUNTER — OFFICE VISIT (OUTPATIENT)
Dept: PODIATRY | Age: 27
End: 2022-07-27
Payer: COMMERCIAL

## 2022-07-27 VITALS — BODY MASS INDEX: 31.58 KG/M2 | HEIGHT: 64 IN | WEIGHT: 185 LBS

## 2022-07-27 DIAGNOSIS — L60.0 OC (ONYCHOCRYPTOSIS): Primary | ICD-10-CM

## 2022-07-27 DIAGNOSIS — M79.675 PAIN OF TOE OF LEFT FOOT: ICD-10-CM

## 2022-07-27 PROCEDURE — G8427 DOCREV CUR MEDS BY ELIG CLIN: HCPCS | Performed by: PODIATRIST

## 2022-07-27 PROCEDURE — 1036F TOBACCO NON-USER: CPT | Performed by: PODIATRIST

## 2022-07-27 PROCEDURE — G8417 CALC BMI ABV UP PARAM F/U: HCPCS | Performed by: PODIATRIST

## 2022-07-27 PROCEDURE — 11750 EXCISION NAIL&NAIL MATRIX: CPT | Performed by: PODIATRIST

## 2022-07-27 PROCEDURE — 99203 OFFICE O/P NEW LOW 30 MIN: CPT | Performed by: PODIATRIST

## 2022-07-27 NOTE — PROGRESS NOTES
Patient is in today for evaluation of possible ingrown toenail to the left great toe.  Pcp is Priscilla Tarango,   Last ov 1/4/22

## 2022-07-27 NOTE — PROGRESS NOTES
22     Melecio Schmidt    : 1995 Sex: female   Age: 33 y.o. Patient was referred by: None  Patient's PCP/Provider is: Priscilla Tarango DO    Subjective:    Patient seen today for evaluation regarding chronic ingrown nail left great toe. Chief Complaint   Patient presents with    Ingrown Toenail     Left great toe        HPI: Patient stated issues are causing discomfort with certain activities and shoe gear on a daily basis. She has had matrix procedures by another area physician over the last several years. She denies any nausea, vomiting, fever, chills. No other additional abnormalities noted. ROS:  Const: Positives and pertinent negatives as per HPI. Musculo: Denies symptoms other than stated above. Neuro: Denies symptoms other than stated above. Skin: Denies symptoms other than stated above.     Current Medications:    Current Outpatient Medications:     methylPREDNISolone (MEDROL DOSEPACK) 4 MG tablet, Take by mouth., Disp: 1 kit, Rfl: 0    ondansetron (ZOFRAN-ODT) 4 MG disintegrating tablet, Take 1 tablet by mouth 3 times daily as needed for Nausea or Vomiting, Disp: 21 tablet, Rfl: 0    vitamin D (ERGOCALCIFEROL) 1.25 MG (50620 UT) CAPS capsule, Take 1 capsule by mouth once a week (Patient not taking: Reported on 2022), Disp: 12 capsule, Rfl: 1    Allergies:  No Known Allergies    Vitals:    22 1328   Weight: 185 lb (83.9 kg)   Height: 5' 4\" (1.626 m)        Past Medical History:   Diagnosis Date    Depression      Family History   Problem Relation Age of Onset    Heart Disease Mother     Sleep Apnea Father     Breast Cancer Maternal Grandmother     Cancer Paternal Grandfather      Past Surgical History:   Procedure Laterality Date    CYST REMOVAL Right 2016    removed from right breast, benign    EYE SURGERY       Social History     Tobacco Use    Smoking status: Never    Smokeless tobacco: Never   Substance Use Topics    Alcohol use: Yes     Comment: occasionally Drug use: Never           Diagnostic studies:    No results found. Procedures:    Matrixectomy phenol alcohol    I discussed with the patient the procedure in extensive detail. We discussed the potential for recurrence, infection, prolonged drainage, delayed healing, overcorrection, undercorrection, recurrence of the deformity and potential need for further treatment. The patient understood. All questions were answered and all concerns were addressed. No guarantees were given. Upon receiving consent, the patient was brought to the treatment room and placed in the supine position where local anesthesia was achieved. The local anesthesia consisted of 4 cc of 2% Xylocaine plain to the left great toe. The left great toe was prepped and draped in the usual fashion. Hemostasis was achieved via Penrose drain. At this point in time, a partial matrixectomy was performed along the lateral aspect of the left great toe. A spatula was used to free the lateral border of the left great toenail. Next, an English anvil was used to transect the nail border to the eponychium. Next, a 58 blade was utilized to transect the nail border proximal to the eponychium. The nail border was removed utilizing straight hemostats. Three 30 second applications of phenol were utilized to perform a chemical matrixectomy. The area was flushed with alcohol and dressed with antibiotic/Adaptic, 4x4, Aaron, and Coban in a compressive fashion. The tourniquet was released and normal vascular capillary fill time was noted to return to the end of the digit. The patient tolerated the procedure and anesthesia well and left the office with vital signs stable and vascular status intact. The patient was given home going instructions and will reappoint for postperative follow up. Exam:  VASCULAR: Pedal pulses palpable left foot.   CFT brisk digits 1 through 5 left foot  NEUROLOGICAL: Epicritic sensations intact digital regions left foot  DERMATOLOGICAL: Ingrown issue noted medial border left great toe. No signs of infection noted left great toe. MUSCULOSKELETAL: Noncontributory    Plan Per Assessment  Fernie Quiñones was seen today for ingrown toenail. Diagnoses and all orders for this visit:    OC (onychocryptosis)    Pain of toe of left foot        New patient evaluation and management  Partial matrix procedure performed left great toe medial border as described above to patient tolerance. Patient was given home-going instructions to be performed on a daily basis to the matrix site left great toe. Patient will be followed up at a later date for continued evaluation and care. Seen By:    Elio Jefferson DPM    Electronically signed by Elio Jefferson DPM on 7/27/2022 at 2:09 PM      This note was created using voice recognition software. The note was reviewed however may contain grammatical errors.

## 2022-07-28 ENCOUNTER — TELEPHONE (OUTPATIENT)
Dept: PODIATRY | Age: 27
End: 2022-07-28

## 2022-07-28 DIAGNOSIS — L60.0 OC (ONYCHOCRYPTOSIS): Primary | ICD-10-CM

## 2022-07-28 RX ORDER — DOXYCYCLINE HYCLATE 100 MG
100 TABLET ORAL 2 TIMES DAILY
Qty: 28 TABLET | Refills: 0 | Status: SHIPPED | OUTPATIENT
Start: 2022-07-28 | End: 2022-08-11

## 2022-07-28 NOTE — TELEPHONE ENCOUNTER
Patient called in stating that when she removed the bandage on her toe this morning, there was an excessive amount of drainage that was yellow/green. Patient also said there was pus present. She did develop a low-grade fever that has increased since the morning. Please advise.

## 2022-08-11 ENCOUNTER — TELEPHONE (OUTPATIENT)
Dept: ADMINISTRATIVE | Age: 27
End: 2022-08-11

## 2022-08-11 NOTE — TELEPHONE ENCOUNTER
Patient got called into work today and cancelled her POST OP visit. Can you please r/s the appt? The next opening was not until 8/30 and I thought the POST OP probably needed seen before then.

## 2022-09-15 ENCOUNTER — OFFICE VISIT (OUTPATIENT)
Dept: FAMILY MEDICINE CLINIC | Age: 27
End: 2022-09-15
Payer: COMMERCIAL

## 2022-09-15 VITALS
BODY MASS INDEX: 31.58 KG/M2 | WEIGHT: 185 LBS | TEMPERATURE: 97.6 F | DIASTOLIC BLOOD PRESSURE: 76 MMHG | RESPIRATION RATE: 18 BRPM | SYSTOLIC BLOOD PRESSURE: 110 MMHG | HEART RATE: 70 BPM | OXYGEN SATURATION: 99 % | HEIGHT: 64 IN

## 2022-09-15 DIAGNOSIS — J02.9 ACUTE PHARYNGITIS, UNSPECIFIED ETIOLOGY: Primary | ICD-10-CM

## 2022-09-15 DIAGNOSIS — B34.9 VIRAL ILLNESS: ICD-10-CM

## 2022-09-15 LAB
Lab: NORMAL
PERFORMING INSTRUMENT: NORMAL
QC PASS/FAIL: NORMAL
S PYO AG THROAT QL: NORMAL
SARS-COV-2, POC: NORMAL

## 2022-09-15 PROCEDURE — 87880 STREP A ASSAY W/OPTIC: CPT

## 2022-09-15 PROCEDURE — 87426 SARSCOV CORONAVIRUS AG IA: CPT

## 2022-09-15 PROCEDURE — 99213 OFFICE O/P EST LOW 20 MIN: CPT

## 2022-09-15 NOTE — LETTER
[facility] In  [address] [place] [address]  Phone: [phone]  Fax: [phone]    Gita Ruth        September 15, 2022     Patient: Aleksey Avelar   YOB: 1995   Date of Visit: 9/15/2022       To Whom It May Concern: It is my medical opinion that Cesar Sosa may return to work on 9/16, tested negative for COVID-19 and strep in office today. If you have any questions or concerns, please don't hesitate to call.     Sincerely,        JENNIFFER Ruth

## 2022-09-15 NOTE — PROGRESS NOTES
breath sounds equal.    CV: Regular rate and rhythm, normal heart sounds, without pathological murmurs, ectopy, gallops, or rubs. Skin:  No rashes, erythema present. Lymphatics: No lymphangitis or adenopathy noted other then stated above. Neurological:  Alert and orientated. Motor functions intact. Responds to commands. Lab / Imaging Results   (All laboratory and radiology results have been personally reviewed by myself)  Labs:  Results for orders placed or performed in visit on 09/15/22   POCT COVID-19, Antigen   Result Value Ref Range    SARS-COV-2, POC Not-Detected Not Detected    Lot Number [id number]     QC Pass/Fail pass     Performing Instrument BD Veritor    POCT rapid strep A   Result Value Ref Range    Strep A Ag None Detected None Detected       Imaging: All Radiology results interpreted by Radiologist unless otherwise noted. Assessment / Plan     Impression(s):  G[place] was seen today for fever. Diagnoses and all orders for this visit:    Acute pharyngitis, unspecified etiology  -     POCT COVID-19, Antigen  -     POCT rapid strep A    Viral illness  -     POCT COVID-19, Antigen  -     POCT rapid strep A    Disposition:  Disposition: Discharge to home. Rapid strep came back negative. Pt advised that her illness is likely viral and should resolve with time and conservative measures. Increase fluids and rest. NSAIDs prn pain/fever. F/u PCP in 5-7 days if symptoms persist. ED sooner if symptoms worsen or change. ED immediately with the development of refractory fever, shaking chills, dyspnea, dysphagia, neck stiffness, vomiting, etc. Pt is in agreement with this care plan. All questions answered. JENNIFFER Hunt    **This report was transcribed using voice recognition software. Every effort was made to ensure accuracy; however, inadvertent computerized transcription errors may be present.

## 2022-09-19 ENCOUNTER — OFFICE VISIT (OUTPATIENT)
Dept: FAMILY MEDICINE CLINIC | Age: 27
End: 2022-09-19
Payer: COMMERCIAL

## 2022-09-19 VITALS
TEMPERATURE: 98 F | OXYGEN SATURATION: 99 % | RESPIRATION RATE: 16 BRPM | WEIGHT: 189 LBS | HEART RATE: 69 BPM | SYSTOLIC BLOOD PRESSURE: 114 MMHG | HEIGHT: 64 IN | DIASTOLIC BLOOD PRESSURE: 70 MMHG | BODY MASS INDEX: 32.27 KG/M2

## 2022-09-19 DIAGNOSIS — N39.0 URINARY TRACT INFECTION WITH HEMATURIA, SITE UNSPECIFIED: ICD-10-CM

## 2022-09-19 DIAGNOSIS — R10.9 LEFT FLANK PAIN: ICD-10-CM

## 2022-09-19 DIAGNOSIS — R19.7 DIARRHEA, UNSPECIFIED TYPE: ICD-10-CM

## 2022-09-19 DIAGNOSIS — R31.9 URINARY TRACT INFECTION WITH HEMATURIA, SITE UNSPECIFIED: ICD-10-CM

## 2022-09-19 DIAGNOSIS — R50.9 FEVER, UNSPECIFIED FEVER CAUSE: Primary | ICD-10-CM

## 2022-09-19 LAB
BILIRUBIN, POC: ABNORMAL
BLOOD URINE, POC: ABNORMAL
CLARITY, POC: CLEAR
COLOR, POC: YELLOW
CONTROL: NORMAL
GLUCOSE URINE, POC: ABNORMAL
KETONES, POC: ABNORMAL
LEUKOCYTE EST, POC: ABNORMAL
Lab: NORMAL
NITRITE, POC: ABNORMAL
PH, POC: 5.5
PREGNANCY TEST URINE, POC: NORMAL
PROTEIN, POC: ABNORMAL
QC PASS/FAIL: NORMAL
SARS-COV-2 RDRP RESP QL NAA+PROBE: NEGATIVE
SPECIFIC GRAVITY, POC: >1.03
UROBILINOGEN, POC: 0.2

## 2022-09-19 PROCEDURE — 1036F TOBACCO NON-USER: CPT | Performed by: NURSE PRACTITIONER

## 2022-09-19 PROCEDURE — 87635 SARS-COV-2 COVID-19 AMP PRB: CPT | Performed by: NURSE PRACTITIONER

## 2022-09-19 PROCEDURE — G8427 DOCREV CUR MEDS BY ELIG CLIN: HCPCS | Performed by: NURSE PRACTITIONER

## 2022-09-19 PROCEDURE — 99214 OFFICE O/P EST MOD 30 MIN: CPT | Performed by: NURSE PRACTITIONER

## 2022-09-19 PROCEDURE — 81025 URINE PREGNANCY TEST: CPT | Performed by: NURSE PRACTITIONER

## 2022-09-19 PROCEDURE — 81002 URINALYSIS NONAUTO W/O SCOPE: CPT | Performed by: NURSE PRACTITIONER

## 2022-09-19 PROCEDURE — G8417 CALC BMI ABV UP PARAM F/U: HCPCS | Performed by: NURSE PRACTITIONER

## 2022-09-19 RX ORDER — CEFDINIR 300 MG/1
300 CAPSULE ORAL 2 TIMES DAILY
Qty: 14 CAPSULE | Refills: 0 | Status: SHIPPED
Start: 2022-09-19 | End: 2022-09-22 | Stop reason: ALTCHOICE

## 2022-09-19 NOTE — LETTER
[facility] In  [address] [place] [address]  Phone: [phone]  Fax: [phone] - KIM        September 19, 2022      Israel Henson was seen and treated at [facility] today. Please excuse from work / school. If there are any questions or concerns please contact the office.       Sincerely,        SONIA Johnston CNP

## 2022-09-19 NOTE — PROGRESS NOTES
22  Kelsea Herndon : 1995 Sex: female  Age 33 y.o. Subjective:  Chief Complaint   Patient presents with    Flank Pain     Left side, started this morning. Diarrhea     Started yesterday morning. Fever     Has had on and off since Thursday. She was sent home from work today with a temp of 100.4. She took tylenol about 45 minutes ago. HPI:   Kelsea Herndon , 33 y.o. female presents to the clinic for evaluation of intermittent fever x 6 days. The patient also reports left flank pain, dysuria, intermittent abdominal cramping, nausea, and diarrhea. The paint was seen on 9/15/22 for fever. The patient has taken Tylenol for symptoms. The patient reports unchanged symptoms over time. The patient reports COVID-19 ill exposure. The patient denies hx of COVID-19. The patient denies acute loss of taste and smell, hematuria, pregnancy, headache, sinus congestion, cough, sore throat, and rash. The patient also denies chest pain, abdominal pain, shortness of breath, and nausea / vomiting. ROS:   Unless otherwise stated in this report the patient's positive and negative responses for review of systems for constitutional, eyes, ENT, cardiovascular, respiratory, gastrointestinal, neurological, , musculoskeletal, and integument systems and related systems to the presenting problem are either stated in the history of present illness or were not pertinent or were negative for the symptoms and/or complaints related to the presenting medical problem. Positives and pertinent negatives as per HPI. All others reviewed and are negative.       PMH:     Past Medical History:   Diagnosis Date    Depression        Past Surgical History:   Procedure Laterality Date    CYST REMOVAL Right     removed from right breast, benign    EYE SURGERY         Family History   Problem Relation Age of Onset    Heart Disease Mother     Sleep Apnea Father     Breast Cancer Maternal Grandmother     Cancer Paternal Grandfather        Medications:     Current Outpatient Medications:     cefdinir (OMNICEF) 300 MG capsule, Take 1 capsule by mouth 2 times daily for 7 days, Disp: 14 capsule, Rfl: 0    methylPREDNISolone (MEDROL DOSEPACK) 4 MG tablet, Take by mouth., Disp: 1 kit, Rfl: 0    ondansetron (ZOFRAN-ODT) 4 MG disintegrating tablet, Take 1 tablet by mouth 3 times daily as needed for Nausea or Vomiting, Disp: 21 tablet, Rfl: 0    vitamin D (ERGOCALCIFEROL) 1.25 MG (46422 UT) CAPS capsule, Take 1 capsule by mouth once a week (Patient not taking: Reported on 5/19/2022), Disp: 12 capsule, Rfl: 1    Allergies:   No Known Allergies    Social History:     Social History     Tobacco Use    Smoking status: Never    Smokeless tobacco: Never   Substance Use Topics    Alcohol use: Yes     Comment: occasionally    Drug use: Never       Physical Exam:     Vitals:    09/19/22 1200   BP: 114/70   Pulse: 69   Resp: 16   Temp: 98 °F (36.7 °C)   TempSrc: Temporal   SpO2: 99%   Weight: 189 lb (85.7 kg)   Height: 5' 4\" (1.626 m)       Physical Exam (PE)    Physical Exam  Constitutional:       Appearance: Normal appearance. HENT:      Head: Normocephalic. Right Ear: Tympanic membrane, ear canal and external ear normal.      Left Ear: Tympanic membrane, ear canal and external ear normal.      Nose: Nose normal.      Mouth/Throat:      Mouth: Mucous membranes are moist.      Pharynx: Oropharynx is clear. Eyes:      Pupils: Pupils are equal, round, and reactive to light. Cardiovascular:      Rate and Rhythm: Normal rate and regular rhythm. Pulses: Normal pulses. Heart sounds: Normal heart sounds. Pulmonary:      Effort: Pulmonary effort is normal.      Breath sounds: Normal breath sounds. Abdominal:      General: Bowel sounds are normal. There is no distension. Palpations: Abdomen is soft. Tenderness: There is abdominal tenderness. There is left CVA tenderness.  There is no right CVA tenderness, guarding or rebound. Musculoskeletal:         General: Normal range of motion. Cervical back: Normal range of motion and neck supple. Lymphadenopathy:      Cervical: No cervical adenopathy. Skin:     General: Skin is warm and dry. Capillary Refill: Capillary refill takes less than 2 seconds. Neurological:      General: No focal deficit present. Mental Status: She is alert and oriented to person, place, and time. Psychiatric:         Mood and Affect: Mood normal.         Behavior: Behavior normal.        Testing:   (All laboratory and radiology results have been personally reviewed by myself)  Labs:  Results for orders placed or performed in visit on 09/19/22   POCT Urinalysis no Micro   Result Value Ref Range    Color, UA yellow     Clarity, UA clear     Glucose, UA POC neg     Bilirubin, UA neg     Ketones, UA neg     Spec Grav, UA >1.030     Blood, UA POC mod     pH, UA 5.5     Protein, UA POC neg     Urobilinogen, UA 0.2     Leukocytes, UA trace     Nitrite, UA neg    POCT urine pregnancy   Result Value Ref Range    Preg Test, Ur Neg     Control OK    POCT COVID-19 Rapid, NAAT   Result Value Ref Range    SARS-COV-2, RdRp gene Negative Negative    Lot Number [id number]     QC Pass/Fail pass        Imaging: All Radiology results interpreted by Radiologist unless otherwise noted. XR ABDOMEN (KUB) (SINGLE AP VIEW)    (Results Pending)       Assessment / Plan:   The patient's vitals, allergies, medications, and past medical history have been reviewed. Valerie Arroyo was seen today for flank pain, diarrhea and fever. Diagnoses and all orders for this visit:    Fever, unspecified fever cause  -     POCT Urinalysis no Micro  -     POCT COVID-19 Rapid, NAAT  -     XR ABDOMEN (KUB) (SINGLE AP VIEW); Future    Urinary tract infection with hematuria, site unspecified  -     Culture, Urine; Future  -     XR ABDOMEN (KUB) (SINGLE AP VIEW); Future  -     cefdinir (OMNICEF) 300 MG capsule;  Take 1 capsule by mouth 2 times daily for 7 days    Left flank pain  -     POCT Urinalysis no Micro  -     POCT urine pregnancy  -     XR ABDOMEN (KUB) (SINGLE AP VIEW); Future    Diarrhea, unspecified type  -     POCT COVID-19 Rapid, NAAT      - Disposition: Home    - Educational material printed for patient's review and were included in patient instructions. After Visit Summary was given to patient at the end of visit. - Encouraged oral fluids and rest. Discussed symptomatic treatments with patient today including Tylenol prn for fever / pain. Schedule a follow-up with PCP in 2-3 days. Red flag symptoms were discussed with the patient today. The patient is directed to go the ED if symptoms change or worsen. Pt verbalizes understanding and is in agreement with plan of care. All questions answered. SIGNATURE: YUMIKO Henriquez    *NOTE: This report was transcribed using voice recognition software. Every effort was made to ensure accuracy; however, inadvertent computerized transcription errors may be present.

## 2022-09-22 DIAGNOSIS — N39.0 URINARY TRACT INFECTION WITH HEMATURIA, SITE UNSPECIFIED: Primary | ICD-10-CM

## 2022-09-22 DIAGNOSIS — R31.9 URINARY TRACT INFECTION WITH HEMATURIA, SITE UNSPECIFIED: Primary | ICD-10-CM

## 2022-09-22 LAB
ORGANISM: ABNORMAL
URINE CULTURE, ROUTINE: ABNORMAL

## 2022-09-22 RX ORDER — CIPROFLOXACIN 250 MG/1
250 TABLET, FILM COATED ORAL 2 TIMES DAILY
Qty: 6 TABLET | Refills: 0 | Status: SHIPPED | OUTPATIENT
Start: 2022-09-22 | End: 2022-09-25

## 2022-10-08 ENCOUNTER — HOSPITAL ENCOUNTER (EMERGENCY)
Age: 27
Discharge: HOME | End: 2022-10-09
Attending: EMERGENCY MEDICINE
Payer: COMMERCIAL

## 2022-10-08 DIAGNOSIS — F39 MOOD DISORDER (HCC): Primary | ICD-10-CM

## 2022-10-08 LAB
BASOPHILS ABSOLUTE: 0.08 E9/L (ref 0–0.2)
BASOPHILS RELATIVE PERCENT: 0.9 % (ref 0–2)
EOSINOPHILS ABSOLUTE: 0.66 E9/L (ref 0.05–0.5)
EOSINOPHILS RELATIVE PERCENT: 7.5 % (ref 0–6)
HCG, URINE, POC: NEGATIVE
HCT VFR BLD CALC: 40.1 % (ref 34–48)
HEMOGLOBIN: 13.4 G/DL (ref 11.5–15.5)
IMMATURE GRANULOCYTES #: 0.02 E9/L
IMMATURE GRANULOCYTES %: 0.2 % (ref 0–5)
LYMPHOCYTES ABSOLUTE: 2.56 E9/L (ref 1.5–4)
LYMPHOCYTES RELATIVE PERCENT: 29 % (ref 20–42)
Lab: NORMAL
MCH RBC QN AUTO: 29.2 PG (ref 26–35)
MCHC RBC AUTO-ENTMCNC: 33.4 % (ref 32–34.5)
MCV RBC AUTO: 87.4 FL (ref 80–99.9)
MONOCYTES ABSOLUTE: 0.71 E9/L (ref 0.1–0.95)
MONOCYTES RELATIVE PERCENT: 8 % (ref 2–12)
NEGATIVE QC PASS/FAIL: NORMAL
NEUTROPHILS ABSOLUTE: 4.79 E9/L (ref 1.8–7.3)
NEUTROPHILS RELATIVE PERCENT: 54.4 % (ref 43–80)
PDW BLD-RTO: 13.5 FL (ref 11.5–15)
PLATELET # BLD: 340 E9/L (ref 130–450)
PMV BLD AUTO: 9.7 FL (ref 7–12)
POSITIVE QC PASS/FAIL: NORMAL
RBC # BLD: 4.59 E12/L (ref 3.5–5.5)
WBC # BLD: 8.8 E9/L (ref 4.5–11.5)

## 2022-10-08 PROCEDURE — 80143 DRUG ASSAY ACETAMINOPHEN: CPT

## 2022-10-08 PROCEDURE — 87502 INFLUENZA DNA AMP PROBE: CPT

## 2022-10-08 PROCEDURE — 80307 DRUG TEST PRSMV CHEM ANLYZR: CPT

## 2022-10-08 PROCEDURE — 87635 SARS-COV-2 COVID-19 AMP PRB: CPT

## 2022-10-08 PROCEDURE — 80053 COMPREHEN METABOLIC PANEL: CPT

## 2022-10-08 PROCEDURE — 80179 DRUG ASSAY SALICYLATE: CPT

## 2022-10-08 PROCEDURE — 82077 ASSAY SPEC XCP UR&BREATH IA: CPT

## 2022-10-08 PROCEDURE — 99283 EMERGENCY DEPT VISIT LOW MDM: CPT

## 2022-10-08 PROCEDURE — 85025 COMPLETE CBC W/AUTO DIFF WBC: CPT

## 2022-10-09 VITALS
SYSTOLIC BLOOD PRESSURE: 108 MMHG | BODY MASS INDEX: 32.78 KG/M2 | HEIGHT: 64 IN | WEIGHT: 192 LBS | OXYGEN SATURATION: 98 % | TEMPERATURE: 98.7 F | RESPIRATION RATE: 16 BRPM | DIASTOLIC BLOOD PRESSURE: 53 MMHG | HEART RATE: 64 BPM

## 2022-10-09 LAB
ACETAMINOPHEN LEVEL: <5 MCG/ML (ref 10–30)
ALBUMIN SERPL-MCNC: 4.2 G/DL (ref 3.5–5.2)
ALP BLD-CCNC: 63 U/L (ref 35–104)
ALT SERPL-CCNC: 15 U/L (ref 0–32)
AMPHETAMINE SCREEN, URINE: NOT DETECTED
ANION GAP SERPL CALCULATED.3IONS-SCNC: 9 MMOL/L (ref 7–16)
AST SERPL-CCNC: 14 U/L (ref 0–31)
BARBITURATE SCREEN URINE: NOT DETECTED
BENZODIAZEPINE SCREEN, URINE: NOT DETECTED
BILIRUB SERPL-MCNC: 0.5 MG/DL (ref 0–1.2)
BUN BLDV-MCNC: 15 MG/DL (ref 6–20)
CALCIUM SERPL-MCNC: 9.3 MG/DL (ref 8.6–10.2)
CANNABINOID SCREEN URINE: NOT DETECTED
CHLORIDE BLD-SCNC: 104 MMOL/L (ref 98–107)
CO2: 23 MMOL/L (ref 22–29)
COCAINE METABOLITE SCREEN URINE: NOT DETECTED
CREAT SERPL-MCNC: 0.7 MG/DL (ref 0.5–1)
ETHANOL: <10 MG/DL (ref 0–0.08)
FENTANYL SCREEN, URINE: NOT DETECTED
GFR AFRICAN AMERICAN: >60
GFR NON-AFRICAN AMERICAN: >60 ML/MIN/1.73
GLUCOSE BLD-MCNC: 127 MG/DL (ref 74–99)
INFLUENZA A BY PCR: NOT DETECTED
INFLUENZA B BY PCR: NOT DETECTED
Lab: NORMAL
METHADONE SCREEN, URINE: NOT DETECTED
OPIATE SCREEN URINE: NOT DETECTED
OXYCODONE URINE: NOT DETECTED
PHENCYCLIDINE SCREEN URINE: NOT DETECTED
POTASSIUM REFLEX MAGNESIUM: 4.2 MMOL/L (ref 3.5–5)
SALICYLATE, SERUM: <0.3 MG/DL (ref 0–30)
SARS-COV-2, NAAT: NOT DETECTED
SODIUM BLD-SCNC: 136 MMOL/L (ref 132–146)
TOTAL PROTEIN: 7.4 G/DL (ref 6.4–8.3)
TRICYCLIC ANTIDEPRESSANTS SCREEN SERUM: NEGATIVE NG/ML

## 2022-10-09 ASSESSMENT — ENCOUNTER SYMPTOMS
SHORTNESS OF BREATH: 0
COUGH: 0
DIARRHEA: 0
VOMITING: 0
BACK PAIN: 0
WHEEZING: 0
EYE PAIN: 0
NAUSEA: 0
EYE REDNESS: 0
EYE DISCHARGE: 0
SINUS PRESSURE: 0
ABDOMINAL DISTENTION: 0
SORE THROAT: 0

## 2022-10-09 NOTE — ED PROVIDER NOTES
Department of Emergency Medicine   ED  Provider Note  Admit Date/RoomTime: 10/8/2022 10:49 PM  ED Room: 16/16          History of Present Illness:  10/8/22, Time: 11:17 PM EDT  Chief Complaint   Patient presents with    Other     Behavioral health evaluation            Aman Bah is a 33 y.o. female presenting to the ED for depression, beginning today. The complaint has been constant, severe in severity, and worsened by nothing. Patient states that she has been dealing with two jobs, her two year old kid, and being kicked out of her parents' home. She denies any plan of hurting herself but has had thoughts of if her son would be better without her. She denies any homicidal ideations, hallucinations, or drug use. She denies any medical complaints at this time. Review of Systems   Constitutional:  Negative for chills and fever. HENT:  Negative for ear pain, sinus pressure and sore throat. Eyes:  Negative for pain, discharge and redness. Respiratory:  Negative for cough, shortness of breath and wheezing. Cardiovascular:  Negative for chest pain. Gastrointestinal:  Negative for abdominal distention, diarrhea, nausea and vomiting. Genitourinary:  Negative for dysuria and frequency. Musculoskeletal:  Negative for arthralgias and back pain. Skin:  Negative for rash and wound. Neurological:  Negative for weakness and headaches. Hematological:  Negative for adenopathy. Psychiatric/Behavioral:  Positive for dysphoric mood. Negative for self-injury and suicidal ideas. All other systems reviewed and are negative.       --------------------------------------------- PAST HISTORY ---------------------------------------------  Past Medical History:  has a past medical history of Depression. Past Surgical History:  has a past surgical history that includes cyst removal (Right, 2016) and eye surgery. Social History:  reports that she has never smoked.  She has never used smokeless tobacco. She reports current alcohol use. She reports that she does not use drugs. Family History: family history includes Breast Cancer in her maternal grandmother; Cancer in her paternal grandfather; Heart Disease in her mother; Sleep Apnea in her father. . Unless otherwise noted, family history is non contributory    The patients home medications have been reviewed. Allergies: Patient has no known allergies. ---------------------------------------------------PHYSICAL EXAM--------------------------------------    Physical Exam  Vitals and nursing note reviewed. Constitutional:       Appearance: She is well-developed. She is obese. HENT:      Head: Normocephalic and atraumatic. Eyes:      Conjunctiva/sclera: Conjunctivae normal.   Cardiovascular:      Rate and Rhythm: Normal rate and regular rhythm. Heart sounds: Normal heart sounds. No murmur heard. Pulmonary:      Effort: Pulmonary effort is normal. No respiratory distress. Breath sounds: Normal breath sounds. No wheezing or rales. Abdominal:      General: Bowel sounds are normal.      Palpations: Abdomen is soft. Tenderness: There is no abdominal tenderness. There is no guarding or rebound. Musculoskeletal:      Cervical back: Normal range of motion and neck supple. Skin:     General: Skin is warm and dry. Neurological:      Mental Status: She is alert and oriented to person, place, and time. Cranial Nerves: No cranial nerve deficit. Coordination: Coordination normal.          -------------------------------------------------- RESULTS -------------------------------------------------  I have personally reviewed all laboratory and imaging results for this patient. Results are listed below.      LABS: (Lab results interpreted by me)  Results for orders placed or performed during the hospital encounter of 10/08/22   COVID-19, Rapid    Specimen: Nasopharyngeal Swab   Result Value Ref Range    SARS-CoV-2, NAAT Not Detected Not Detected   RAPID INFLUENZA A/B ANTIGENS    Specimen: Nasopharyngeal   Result Value Ref Range    Influenza A by PCR Not Detected Not Detected    Influenza B by PCR Not Detected Not Detected   CBC with Auto Differential   Result Value Ref Range    WBC 8.8 4.5 - 11.5 E9/L    RBC 4.59 3.50 - 5.50 E12/L    Hemoglobin 13.4 11.5 - 15.5 g/dL    Hematocrit 40.1 34.0 - 48.0 %    MCV 87.4 80.0 - 99.9 fL    MCH 29.2 26.0 - 35.0 pg    MCHC 33.4 32.0 - 34.5 %    RDW 13.5 11.5 - 15.0 fL    Platelets 836 514 - 708 E9/L    MPV 9.7 7.0 - 12.0 fL    Neutrophils % 54.4 43.0 - 80.0 %    Immature Granulocytes % 0.2 0.0 - 5.0 %    Lymphocytes % 29.0 20.0 - 42.0 %    Monocytes % 8.0 2.0 - 12.0 %    Eosinophils % 7.5 (H) 0.0 - 6.0 %    Basophils % 0.9 0.0 - 2.0 %    Neutrophils Absolute 4.79 1.80 - 7.30 E9/L    Immature Granulocytes # 0.02 E9/L    Lymphocytes Absolute 2.56 1.50 - 4.00 E9/L    Monocytes Absolute 0.71 0.10 - 0.95 E9/L    Eosinophils Absolute 0.66 (H) 0.05 - 0.50 E9/L    Basophils Absolute 0.08 0.00 - 0.20 E9/L   Comprehensive Metabolic Panel w/ Reflex to MG   Result Value Ref Range    Sodium 136 132 - 146 mmol/L    Potassium reflex Magnesium 4.2 3.5 - 5.0 mmol/L    Chloride 104 98 - 107 mmol/L    CO2 23 22 - 29 mmol/L    Anion Gap 9 7 - 16 mmol/L    Glucose 127 (H) 74 - 99 mg/dL    BUN 15 6 - 20 mg/dL    Creatinine 0.7 0.5 - 1.0 mg/dL    GFR Non-African American >60 >=60 mL/min/1.73    GFR African American >60     Calcium 9.3 8.6 - 10.2 mg/dL    Total Protein 7.4 6.4 - 8.3 g/dL    Albumin 4.2 3.5 - 5.2 g/dL    Total Bilirubin 0.5 0.0 - 1.2 mg/dL    Alkaline Phosphatase 63 35 - 104 U/L    ALT 15 0 - 32 U/L    AST 14 0 - 31 U/L   URINE DRUG SCREEN   Result Value Ref Range    Amphetamine Screen, Urine NOT DETECTED Negative <1000 ng/mL    Barbiturate Screen, Ur NOT DETECTED Negative < 200 ng/mL    Benzodiazepine Screen, Urine NOT DETECTED Negative < 200 ng/mL    Cannabinoid Scrn, Ur NOT DETECTED Negative < 50ng/mL    Cocaine Metabolite Screen, Urine NOT DETECTED Negative < 300 ng/mL    Opiate Scrn, Ur NOT DETECTED Negative < 300ng/mL    PCP Screen, Urine NOT DETECTED Negative < 25 ng/mL    Methadone Screen, Urine NOT DETECTED Negative <300 ng/mL    Oxycodone Urine NOT DETECTED Negative <100 ng/mL    FENTANYL SCREEN, URINE NOT DETECTED Negative <1 ng/mL    Drug Screen Comment: see below    Serum Drug Screen   Result Value Ref Range    Ethanol Lvl <10 mg/dL    Acetaminophen Level <5.0 (L) 10.0 - 95.0 mcg/mL    Salicylate, Serum <8.2 0.0 - 30.0 mg/dL   POC Pregnancy Urine   Result Value Ref Range    HCG, Urine, POC Negative Negative    Lot Number [id number]     Positive QC Pass/Fail Acceptable     Negative QC Pass/Fail Acceptable    ,       RADIOLOGY:  Interpreted by Radiologist unless otherwise specified  No orders to display                    ------------------------- NURSING NOTES AND VITALS REVIEWED ---------------------------   The nursing notes within the ED encounter and vital signs as below have been reviewed by myself  BP (!) 140/74   Pulse 64   Temp 98.2 °F (36.8 °C)   Resp 16   Ht 5' 4\" (1.626 m)   Wt 192 lb (87.1 kg)   LMP 09/14/2022 (Exact Date)   SpO2 98%   BMI 32.96 kg/m²     Oxygen Saturation Interpretation: Normal    The patients available past medical records and past encounters were reviewed. ------------------------------ ED COURSE/MEDICAL DECISION MAKING----------------------  Medications - No data to display         Medical Decision Making:     ED Course as of 10/09/22 0055   Terra Li Oct 09, 2022   4651 Patient presents with depression. She denies any suicidal plan or homicidal ideations. She would like help. She denies any medical complaints. Labwork was unremarkable. Vitals are stable. Patient is medically cleared. Social work consulted. [BB]      ED Course User Index  [BB] Rafaela Arcos, DO        Re-Evaluations:  Patient was reevaluted and continued to be stable.       This patient's ED course included: a personal history and physicial examination and re-evaluation prior to disposition    This patient has remained hemodynamically stable during their ED course. Consultations:  Psychiatry      Critical Care: None       Counseling: The emergency provider has spoken with the patient and discussed todays results, in addition to providing specific details for the plan of care and counseling regarding the diagnosis and prognosis. Questions are answered at this time and they are agreeable with the plan.       --------------------------------- IMPRESSION AND DISPOSITION ---------------------------------    IMPRESSION  1. Mood disorder ([address]        DISPOSITION  Disposition: Pending psych evaluation. Medically cleared for admission  Patient condition is stable    Patient was seen and evaluated by both myself and Santiago Brunner, DO. NOTE: This report was transcribed using voice recognition software.  Every effort was made to ensure accuracy; however, inadvertent computerized transcription errors may be present           Rafaela Arcos DO  Resident  10/09/22 5752

## 2022-10-09 NOTE — PROGRESS NOTES
[address], , CAROLIN, notified patient has received medical clearance. Added to the list for consults. 0100 Faxed telehealth consent form.

## 2022-10-09 NOTE — DISCHARGE INSTRUCTIONS
[facility]  2015  For Residents of [facility]  Call [address] [phone]    * Alcoholics Anonymous [phone]                *Narcotics Anonymous [phone]    [address] [facility] [place] [phone] An [facility], [place], [address]  www.My Best Interest/  *Residents of:  [facility] - [facility] Only for [employer]. ALL other [employer] on [facility] Accepted. *Payments Accepted: Medicaid or Self-pay ONLY for [employer]. Private Insurance accepted for Outpatient Programs   *Services Offered: Outpatient Behavioral Health & Chemical Dependency. [facility] [phone]  [address] [facility], [address] [place] [address], [facility] 210   *Payments Accepted: Medicaid, self-pay  [employer].      Farehelper [phone]  [facility]. [address]  www.[employer]s. [employer]/   *Residents of:  [employer] Only   *Payments Accepted: Medicaid or [address] Offered: Outpatient Behavioral Health & Chemical Dependency. [facility] [phone]  or [phone], [address]  www. [employer]/   *Residents of:  [facility]   *Payments Accepted: Self-Pay and [employer]   *Services Offered: Detox & Outpatient Substance Abuse Programs. [address] [place] [address] Holder [phone]) ext. [phone], [place], [address]  www.[facility].org/  (MUST CALL DAILY FOR BED AVAILABILITY)   *Residents of:  [address] Residents   *Payments Accepted: Medicaid or Self-Pay   *Services Offered: Detox & Outpatient Substance Abuse Programs. [facility] Medicine [phone] [facility] NEAR [address]  www.Anz[employer]. InMyShow/  *Residents of:  [address] [facility] 54 Residents   *Payments Accepted:  Self-Pay, Private Insurance, Some Medicaid for age <20 Years. *Services Offered: [facility] [place] [phone] or [phone]  www. [facility].org/     *Residents of:  All [facility]   *Payments Accepted: [employer], [facility] or Self-Pay. *Services Offered: Following Detox, Outpatient Substance Abuse and [place]  [address] [facility],[address]    [facility], [address] [facility], [address]    [address]   [address] [facility] CENTER, [address]   [facility], [address]      [address] [facility] [address] [phone] 1200 [facility] [address], [address]  www. [employer]/  *Residents of:  All [facility]   *Payments Accepted: [facility]. *Services Offered: Intensive Outpatient Substance Abuse and [facility] [phone]  [facility], [address]  www. [facility]. org/  *Residents of:  [facility]. *Payments Accepted: Private Insurance, [facility] and Self-Pay/Sliding Scale. *Services Offered: Substance Abuse Outpatient Programs. [facility] [facility] [phone]  [address] [facility], [facility] [address]  www.[facility]. org/  *Residents of:  [facility]. *Payments Accepted: Private Insurance, [facility] and Self-Pay. *Services Offered: Substance Abuse Outpatient Programs. Sukh Grimaldo Start [phone] [address], [place] [address] [facility], [address] [facility] [address]  www.Can'tWait/[place]/  *Residents of:  [facility]. *Payments Accepted: Private Insurance, [facility] and Self-pay. *Services Offered: Substance Abuse Outpatient Programs.       [employer] [phone]  www. [facility].org/  [facility] Office:    Adult Office:    Children's Office:  [phone] [place], [address]    [address], [address]               [address] Pun, [address]  *Residents of:  [facility] FOR BEHAVIORAL HEALTH ONLY for Self-Pay. All Counties accepted with [employer], Medicare & Medicaid   *Payments Accepted: Medicare, Medicaid, Private Insurance and Self-Pay  *Services Offered: [address] for Counseling. [facility] [phone] 4775 [facility], [address] [place] [address]  www. [facility]. org/  *Payments Accepted: Sliding Scale for Fees. *Services Offered: Call for Available Services. [employer] [phone] [facility], [place], [address]  www. Certain/  (MUST CALL DAILY FOR BED AVAILABILITY)  *Residents of:  [facility]. *Payments Accepted: Medicaid and Self-Pay. *Services Offered: Detox & Substance Abuse Outpatient Programs.

## 2022-10-09 NOTE — ED TRIAGE NOTES
Pt states no actual thoughts of killing her self, pt states she has had depression before and it is not like that. She has no plan and hasn't actually tried to do any thing to harm herself. Pt states she just feels overwhelmed with life at this moment and she is just very stressed out.

## 2022-10-09 NOTE — ED PROVIDER NOTES
6:01 AM EDT  I received this patient at sign out from Dr. Sharen Moritz. I have discussed the patient's initial exam, treatment and plan of care with the out going physician. I have introduced my self to the patient / family and have answered their questions to this point. I have examined the patient myself and reviewed ordered tests / medications and  reviewed any available results to this point. If a resident is involved in the Emergency Department care, I have discussed my findings and plan with them as well. Patient is awaiting evaluation by the [facility]. Outgoing physician relates that the patient has no homicidal or suicidal thoughts or intentions. She is not pink slipped. 8:02 AM EDT  Notified by nursing staff that the patient wishes to leave. Patient has denied any suicidal or homicidal thoughts. Agreeable to follow-up as an outpatient. Contact information given for various referral agencies. ED Course as of 10/09/22 0802   Anibal Ibarra Oct 09, 2022   6130 Patient presents with depression. She denies any suicidal plan or homicidal ideations. She would like help. She denies any medical complaints. Labwork was unremarkable. Vitals are stable. Patient is medically cleared. Social work consulted. [BB]      ED Course User Index  [BB] Melissa Buckley, DO       --------------------------------------------- PAST HISTORY ---------------------------------------------  Past Medical History:  has a past medical history of Depression. Past Surgical History:  has a past surgical history that includes cyst removal (Right, 2016) and eye surgery. Social History:  reports that she has never smoked. She has never used smokeless tobacco. She reports current alcohol use. She reports that she does not use drugs. Family History: family history includes Breast Cancer in her maternal grandmother; Cancer in her paternal grandfather; Heart Disease in her mother; Sleep Apnea in her father.      The patients home medications have been reviewed. Allergies: Patient has no known allergies.     -------------------------------------------------- RESULTS -------------------------------------------------  Labs:  Results for orders placed or performed during the hospital encounter of 10/08/22   COVID-19, Rapid    Specimen: Nasopharyngeal Swab   Result Value Ref Range    SARS-CoV-2, NAAT Not Detected Not Detected   RAPID INFLUENZA A/B ANTIGENS    Specimen: Nasopharyngeal   Result Value Ref Range    Influenza A by PCR Not Detected Not Detected    Influenza B by PCR Not Detected Not Detected   CBC with Auto Differential   Result Value Ref Range    WBC 8.8 4.5 - 11.5 E9/L    RBC 4.59 3.50 - 5.50 E12/L    Hemoglobin 13.4 11.5 - 15.5 g/dL    Hematocrit 40.1 34.0 - 48.0 %    MCV 87.4 80.0 - 99.9 fL    MCH 29.2 26.0 - 35.0 pg    MCHC 33.4 32.0 - 34.5 %    RDW 13.5 11.5 - 15.0 fL    Platelets 330 691 - 281 E9/L    MPV 9.7 7.0 - 12.0 fL    Neutrophils % 54.4 43.0 - 80.0 %    Immature Granulocytes % 0.2 0.0 - 5.0 %    Lymphocytes % 29.0 20.0 - 42.0 %    Monocytes % 8.0 2.0 - 12.0 %    Eosinophils % 7.5 (H) 0.0 - 6.0 %    Basophils % 0.9 0.0 - 2.0 %    Neutrophils Absolute 4.79 1.80 - 7.30 E9/L    Immature Granulocytes # 0.02 E9/L    Lymphocytes Absolute 2.56 1.50 - 4.00 E9/L    Monocytes Absolute 0.71 0.10 - 0.95 E9/L    Eosinophils Absolute 0.66 (H) 0.05 - 0.50 E9/L    Basophils Absolute 0.08 0.00 - 0.20 E9/L   Comprehensive Metabolic Panel w/ Reflex to MG   Result Value Ref Range    Sodium 136 132 - 146 mmol/L    Potassium reflex Magnesium 4.2 3.5 - 5.0 mmol/L    Chloride 104 98 - 107 mmol/L    CO2 23 22 - 29 mmol/L    Anion Gap 9 7 - 16 mmol/L    Glucose 127 (H) 74 - 99 mg/dL    BUN 15 6 - 20 mg/dL    Creatinine 0.7 0.5 - 1.0 mg/dL    GFR Non-African American >60 >=60 mL/min/1.73    GFR African American >60     Calcium 9.3 8.6 - 10.2 mg/dL    Total Protein 7.4 6.4 - 8.3 g/dL    Albumin 4.2 3.5 - 5.2 g/dL    Total Bilirubin 0.5 0.0 - 1.2 mg/dL Alkaline Phosphatase 63 35 - 104 U/L    ALT 15 0 - 32 U/L    AST 14 0 - 31 U/L   URINE DRUG SCREEN   Result Value Ref Range    Amphetamine Screen, Urine NOT DETECTED Negative <1000 ng/mL    Barbiturate Screen, Ur NOT DETECTED Negative < 200 ng/mL    Benzodiazepine Screen, Urine NOT DETECTED Negative < 200 ng/mL    Cannabinoid Scrn, Ur NOT DETECTED Negative < 50ng/mL    Cocaine Metabolite Screen, Urine NOT DETECTED Negative < 300 ng/mL    Opiate Scrn, Ur NOT DETECTED Negative < 300ng/mL    PCP Screen, Urine NOT DETECTED Negative < 25 ng/mL    Methadone Screen, Urine NOT DETECTED Negative <300 ng/mL    Oxycodone Urine NOT DETECTED Negative <100 ng/mL    FENTANYL SCREEN, URINE NOT DETECTED Negative <1 ng/mL    Drug Screen Comment: see below    Serum Drug Screen   Result Value Ref Range    Ethanol Lvl <10 mg/dL    Acetaminophen Level <5.0 (L) 10.0 - 49.1 mcg/mL    Salicylate, Serum <0.6 0.0 - 30.0 mg/dL    TCA Scrn NEGATIVE Cutoff:300 ng/mL   POC Pregnancy Urine   Result Value Ref Range    HCG, Urine, POC Negative Negative    Lot Number [id number]     Positive QC Pass/Fail Acceptable     Negative QC Pass/Fail Acceptable        Radiology:  No orders to display       ------------------------- NURSING NOTES AND VITALS REVIEWED ---------------------------  Date / Time Roomed:  10/8/2022 10:49 PM  ED Bed Assignment:  16/16    The nursing notes within the ED encounter and vital signs as below have been reviewed. BP (!) 108/53   Pulse 64   Temp 98.7 °F (37.1 °C)   Resp 16   Ht 5' 4\" (1.626 m)   Wt 192 lb (87.1 kg)   LMP 09/14/2022 (Exact Date)   SpO2 98%   BMI 32.96 kg/m²   Oxygen Saturation Interpretation: Normal      ------------------------------------------ PROGRESS NOTES ------------------------------------------  8:02 AM EDT  I have spoken with the patient and discussed todays results, in addition to providing specific details for the plan of care and counseling regarding the diagnosis and prognosis.   Their questions are answered at this time and they are agreeable with the plan. I discussed at length with them reasons for immediate return here for re evaluation. They will followup with their primary care physician by calling their office tomorrow. --------------------------------- ADDITIONAL PROVIDER NOTES ---------------------------------  At this time the patient is without objective evidence of an acute process requiring hospitalization or inpatient management. They have remained hemodynamically stable throughout their entire ED visit and are stable for discharge with outpatient follow-up. The plan has been discussed in detail and they are aware of the specific conditions for emergent return, as well as the importance of follow-up. New Prescriptions    No medications on file       Diagnosis:  1. Mood disorder ([facility] [address]        Disposition:  Patient's disposition: Discharge to home  Patient's condition is stable.        Leola Christianson, [address] [place] [address]  10/09/22 4014

## 2022-10-09 NOTE — ED NOTES
Patient denies suicidal/homicidal ideations at this time. States that she has been feeling overwhelmed by life and feels that she is safe to go home. Provided patient with resources for outpatient psychiatric services.       Jolene Alvarez RN  10/09/22 0722

## 2022-10-12 ENCOUNTER — OFFICE VISIT (OUTPATIENT)
Dept: FAMILY MEDICINE CLINIC | Age: 27
End: 2022-10-12
Payer: COMMERCIAL

## 2022-10-12 VITALS
TEMPERATURE: 97 F | RESPIRATION RATE: 16 BRPM | WEIGHT: 192 LBS | HEIGHT: 64 IN | OXYGEN SATURATION: 98 % | SYSTOLIC BLOOD PRESSURE: 110 MMHG | HEART RATE: 72 BPM | BODY MASS INDEX: 32.78 KG/M2 | DIASTOLIC BLOOD PRESSURE: 68 MMHG

## 2022-10-12 DIAGNOSIS — H10.502 BLEPHAROCONJUNCTIVITIS OF LEFT EYE, UNSPECIFIED BLEPHAROCONJUNCTIVITIS TYPE: ICD-10-CM

## 2022-10-12 DIAGNOSIS — M54.41 ACUTE RIGHT-SIDED LOW BACK PAIN WITH RIGHT-SIDED SCIATICA: Primary | ICD-10-CM

## 2022-10-12 PROCEDURE — G8484 FLU IMMUNIZE NO ADMIN: HCPCS

## 2022-10-12 PROCEDURE — 99213 OFFICE O/P EST LOW 20 MIN: CPT

## 2022-10-12 PROCEDURE — G8417 CALC BMI ABV UP PARAM F/U: HCPCS

## 2022-10-12 PROCEDURE — G8427 DOCREV CUR MEDS BY ELIG CLIN: HCPCS

## 2022-10-12 PROCEDURE — 1036F TOBACCO NON-USER: CPT

## 2022-10-12 PROCEDURE — 96372 THER/PROPH/DIAG INJ SC/IM: CPT

## 2022-10-12 RX ORDER — METHYLPREDNISOLONE 4 MG/1
TABLET ORAL
Qty: 1 KIT | Refills: 0 | Status: SHIPPED | OUTPATIENT
Start: 2022-10-12

## 2022-10-12 RX ORDER — KETOROLAC TROMETHAMINE 30 MG/ML
30 INJECTION, SOLUTION INTRAMUSCULAR; INTRAVENOUS ONCE
Status: COMPLETED | OUTPATIENT
Start: 2022-10-12 | End: 2022-10-12

## 2022-10-12 RX ORDER — TOBRAMYCIN 3 MG/ML
1 SOLUTION/ DROPS OPHTHALMIC EVERY 4 HOURS
Qty: 5 ML | Refills: 0 | Status: SHIPPED | OUTPATIENT
Start: 2022-10-12 | End: 2022-10-22

## 2022-10-12 RX ADMIN — KETOROLAC TROMETHAMINE 30 MG: 30 INJECTION, SOLUTION INTRAMUSCULAR; INTRAVENOUS at 10:34

## 2022-10-12 NOTE — LETTER
[facility]  [address] [place] [address]  Phone: [phone]  Fax: [phone]    Polina Ledezma, [address]        October 12, 2022     Patient: Kareem Cabrera   YOB: 1995   Date of Visit: 10/12/2022       To Whom It May Concern: It is my medical opinion that Moses Bowie may return to work on 10/13/22. If you have any questions or concerns, please don't hesitate to call.     Sincerely,        JENNIFFER Diop

## 2022-10-12 NOTE — PROGRESS NOTES
Chief Complaint   Back Pain (RT sciatic pain x 2 weeks (states that she has random episode of this since epidural x 2 yrs ago) / has loly't next week for chiropractor next week. IBU not helping.  )    History of Present Illness   Source of history provided by:  patient. Eloise Verduzco is a 33 y.o. old female presenting to the walk in clinic for evaluation of right sided low back pain for the past 10 days. Pt denies any known injury but states she will get \"sciatica flares\" a couple of times of year and this feels similar. Pt has had had back problems in the past.  Pt states the pain is worse with movement and improves with lying flat and sitting. There is radiation of the pain into the buttocks/leg of right side. Pt denies any bowel/bladder incontinence, abdominal pain, hematuria, N/V/D, fever, chills, HA, neck pain, recent illness, dysuria, or lethargy. She has been icing and taking IBU OTC without relief. Has appointment with chiropractor. Also presenting to the walk in clinic with redness, itching, mild irritation, and abnormal drainage to the left eye for the past 2 days. States there was no obvious FB exposure. 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 Denies any visual changes, visual loss, HA, ear pain, fever, chills, N/V, or lethargy. Denies contact lens use, recent URI Sx's, spontaneous onset, close contact with similar symptoms, or work related exposure. Denies history of glaucoma or recent eye surgery.     ROS    Unless otherwise stated in this report or unable to obtain because of the patient's clinical or mental status as evidenced by the medical record, this patients's positive and negative responses for Review of Systems, constitutional, psych, eyes, ENT, cardiovascular, respiratory, gastrointestinal, neurological, genitourinary, musculoskeletal, integument systems and systems related to the presenting problem are either stated in the preceding or were not pertinent or were negative for the symptoms and/or complaints related to the medical problem. Physical Exam         VS:  /68   Pulse 72   Temp 97 °F (36.1 °C) (Temporal)   Resp 16   Ht 5' 4\" (1.626 m)   Wt 192 lb (87.1 kg)   LMP 09/14/2022 (Exact Date)   SpO2 98%   BMI 32.96 kg/m²    Oxygen Saturation Interpretation: Normal.    Constitutional:  Alert, development consistent with age. Neck:  Normal ROM. Supple. No TTP. Eyes: Normal extraocular motion, pupils were equal round and reactive to light, the patient had no pain with extraocular motion. There was evidence of conjunctival injection with yellow discharge to the left eye. The patient did have mild swelling of the upper left eyelid, non-tender. No evidence of preseptal cellulitis or orbital cellulitis. Visual acuity grossly intact. Lungs:  Clear to auscultation and breath sounds equal.  Heart:  Regular rate and rhythm, normal heart sounds, without pathological murmurs, ectopy, gallops, or rubs. Abdomen:  Soft, nontender, good bowel sounds. No firm or pulsatile mass. Back: Tenderness: TTP over paraspinal muscles of right lower back with no appreciable midline tenderness. Swelling: No edema. Range of Motion: Decreased lateral and front to back ROM due to pain. CVA Tenderness: No CVA tenderness bilaterally. Straight leg raising:  Negative straight leg raise. Skin:  No bruising, redness, abrasions, or rashes. Distal Function:              Motor deficit: Strength 5/5 in LE's bilaterally. Sensory deficit: Distal sensation intact. Pulse deficit: Distal pulses 2+ and bounding. Calf Tenderness:  No bilateral calf tenderness. Negative Randa's sign bilaterally               Reflexes: Intact at knee and achilles bilaterally. Gait:  No antalgia noted. Skin:  Normal turgor. Warm, dry, without visible rash. Neurological:  Alert and oriented. Motor functions intact.     Lab / Imaging Results   (All laboratory and radiology results have been personally reviewed by myself)  Labs:  No results found for this visit on 10/12/22. Imaging: All Radiology results interpreted by Radiologist unless otherwise noted. Assessment / Plan     Impression(s):  Allyson Wheeler was seen today for back pain. Diagnoses and all orders for this visit:    Acute right-sided low back pain with right-sided sciatica  -     ketorolac (TORADOL) injection 30 mg  -     methylPREDNISolone (MEDROL DOSEPACK) 4 MG tablet; Take by mouth. Blepharoconjunctivitis of left eye, unspecified blepharoconjunctivitis type  -     methylPREDNISolone (MEDROL DOSEPACK) 4 MG tablet; Take by mouth. -     tobramycin (TOBREX) 0.3 % ophthalmic solution; Place 1 drop into the left eye every 4 hours for 10 days      Disposition:  Disposition: Discharge to home. Scripts written for dose pack and Toradol injection provided in office, side effects discussed. Advise rest, ice, and/or moist heat for additional relief. PCP in 1-2 weeks if symptoms persist. ED sooner if symptoms worsen or change. ED immediately with any fever, severe or worsening back pain, paresthesias, weakness, or GI/ incontinence. Pt states understanding and is in agreement with this care plan. All questions answered. Script written for tobrex ophthalmic drops, side effects and application directions discussed. Advised good handwashing, avoiding rubbing eyes, and washing towels and pillowcase in hot water to prevent spread. F/u with ophthalmology in 48 hrs if not improved. ED sooner if symptoms worsen or change. ED immediately with the development of fever, visual changes, ocular pain/swelling, body aches, or shaking chills. Pt is in agreement with this care plan. All questions answered. JENNIFFER Rutledge    **This report was transcribed using voice recognition software.  Every effort was made to ensure accuracy; however, inadvertent computerized transcription errors may be present.

## 2023-03-21 ENCOUNTER — OFFICE VISIT (OUTPATIENT)
Dept: FAMILY MEDICINE CLINIC | Age: 28
End: 2023-03-21
Payer: COMMERCIAL

## 2023-03-21 VITALS
DIASTOLIC BLOOD PRESSURE: 74 MMHG | OXYGEN SATURATION: 98 % | HEIGHT: 64 IN | HEART RATE: 83 BPM | SYSTOLIC BLOOD PRESSURE: 112 MMHG | WEIGHT: 194.2 LBS | BODY MASS INDEX: 33.16 KG/M2 | TEMPERATURE: 97.8 F | RESPIRATION RATE: 16 BRPM

## 2023-03-21 DIAGNOSIS — J40 SINOBRONCHITIS: Primary | ICD-10-CM

## 2023-03-21 DIAGNOSIS — H66.003 NON-RECURRENT ACUTE SUPPURATIVE OTITIS MEDIA OF BOTH EARS WITHOUT SPONTANEOUS RUPTURE OF TYMPANIC MEMBRANES: ICD-10-CM

## 2023-03-21 DIAGNOSIS — R05.1 ACUTE COUGH: ICD-10-CM

## 2023-03-21 DIAGNOSIS — H10.33 ACUTE CONJUNCTIVITIS OF BOTH EYES, UNSPECIFIED ACUTE CONJUNCTIVITIS TYPE: ICD-10-CM

## 2023-03-21 DIAGNOSIS — J32.9 SINOBRONCHITIS: Primary | ICD-10-CM

## 2023-03-21 DIAGNOSIS — R09.81 SINUS CONGESTION: ICD-10-CM

## 2023-03-21 PROCEDURE — G8417 CALC BMI ABV UP PARAM F/U: HCPCS | Performed by: NURSE PRACTITIONER

## 2023-03-21 PROCEDURE — 1036F TOBACCO NON-USER: CPT | Performed by: NURSE PRACTITIONER

## 2023-03-21 PROCEDURE — G8427 DOCREV CUR MEDS BY ELIG CLIN: HCPCS | Performed by: NURSE PRACTITIONER

## 2023-03-21 PROCEDURE — G8484 FLU IMMUNIZE NO ADMIN: HCPCS | Performed by: NURSE PRACTITIONER

## 2023-03-21 PROCEDURE — 99213 OFFICE O/P EST LOW 20 MIN: CPT | Performed by: NURSE PRACTITIONER

## 2023-03-21 RX ORDER — AMOXICILLIN AND CLAVULANATE POTASSIUM 875; 125 MG/1; MG/1
1 TABLET, FILM COATED ORAL 2 TIMES DAILY
Qty: 20 TABLET | Refills: 0 | Status: SHIPPED | OUTPATIENT
Start: 2023-03-21 | End: 2023-03-31

## 2023-03-21 RX ORDER — POLYMYXIN B SULFATE AND TRIMETHOPRIM 1; 10000 MG/ML; [USP'U]/ML
1 SOLUTION OPHTHALMIC 4 TIMES DAILY
Qty: 1 EACH | Refills: 0 | Status: SHIPPED | OUTPATIENT
Start: 2023-03-21 | End: 2023-03-31

## 2023-03-21 RX ORDER — BROMPHENIRAMINE MALEATE, PSEUDOEPHEDRINE HYDROCHLORIDE, AND DEXTROMETHORPHAN HYDROBROMIDE 2; 30; 10 MG/5ML; MG/5ML; MG/5ML
10 SYRUP ORAL EVERY 6 HOURS PRN
Qty: 240 ML | Refills: 0 | Status: SHIPPED | OUTPATIENT
Start: 2023-03-21

## 2023-03-21 SDOH — ECONOMIC STABILITY: FOOD INSECURITY: WITHIN THE PAST 12 MONTHS, YOU WORRIED THAT YOUR FOOD WOULD RUN OUT BEFORE YOU GOT MONEY TO BUY MORE.: NEVER TRUE

## 2023-03-21 SDOH — ECONOMIC STABILITY: FOOD INSECURITY: WITHIN THE PAST 12 MONTHS, THE FOOD YOU BOUGHT JUST DIDN'T LAST AND YOU DIDN'T HAVE MONEY TO GET MORE.: NEVER TRUE

## 2023-03-21 SDOH — ECONOMIC STABILITY: INCOME INSECURITY: HOW HARD IS IT FOR YOU TO PAY FOR THE VERY BASICS LIKE FOOD, HOUSING, MEDICAL CARE, AND HEATING?: NOT HARD AT ALL

## 2023-03-21 SDOH — ECONOMIC STABILITY: HOUSING INSECURITY
IN THE LAST 12 MONTHS, WAS THERE A TIME WHEN YOU DID NOT HAVE A STEADY PLACE TO SLEEP OR SLEPT IN A SHELTER (INCLUDING NOW)?: NO

## 2023-03-21 ASSESSMENT — PATIENT HEALTH QUESTIONNAIRE - PHQ9
SUM OF ALL RESPONSES TO PHQ QUESTIONS 1-9: 0
2. FEELING DOWN, DEPRESSED OR HOPELESS: 0
1. LITTLE INTEREST OR PLEASURE IN DOING THINGS: 0
SUM OF ALL RESPONSES TO PHQ9 QUESTIONS 1 & 2: 0
SUM OF ALL RESPONSES TO PHQ QUESTIONS 1-9: 0

## 2023-03-21 NOTE — LETTER
[facility]  [address] [place], [place] [address]  Phone: [phone]  Fax: [phone]    SONIA Garrison CNP        March 21, 2023       Earlene Karimi was seen and treated at [facility] today. Please excuse from work / school. If there are any questions or concerns please contact the office.       Sincerely,        SONIA Garrison CNP

## 2023-03-21 NOTE — PROGRESS NOTES
discussed with the patient today. If symptoms worsen the patient is to go directly to the emergency department for reevaluation and treatment. Pt verbalizes understanding and is in agreement with plan of care. All questions answered. SIGNATURE: YUMIKO Mar    *NOTE: This report was transcribed using voice recognition software. Every effort was made to ensure accuracy; however, inadvertent computerized transcription errors may be present.

## 2023-09-19 ENCOUNTER — HOSPITAL ENCOUNTER (OUTPATIENT)
Dept: DATA CONVERSION | Facility: HOSPITAL | Age: 28
Discharge: HOME | End: 2023-09-19
Payer: COMMERCIAL

## 2023-09-19 DIAGNOSIS — Z00.00 ENCOUNTER FOR GENERAL ADULT MEDICAL EXAMINATION WITHOUT ABNORMAL FINDINGS: ICD-10-CM

## 2023-09-19 LAB
ALBUMIN SERPL-MCNC: 4.4 GM/DL (ref 3.5–5)
ALBUMIN/GLOB SERPL: 1.5 RATIO (ref 1.5–3)
ALP BLD-CCNC: 72 U/L (ref 35–125)
ALT SERPL-CCNC: 12 U/L (ref 5–40)
ANION GAP SERPL CALCULATED.3IONS-SCNC: 11 MMOL/L (ref 0–19)
APPEARANCE PLAS: CLEAR
AST SERPL-CCNC: 13 U/L (ref 5–40)
BASOPHILS # BLD AUTO: 0.08 K/UL (ref 0–0.22)
BASOPHILS NFR BLD AUTO: 0.9 % (ref 0–1)
BILIRUB SERPL-MCNC: 0.7 MG/DL (ref 0.1–1.2)
BUN SERPL-MCNC: 11 MG/DL (ref 8–25)
BUN/CREAT SERPL: 13.8 RATIO (ref 8–21)
CALCIUM SERPL-MCNC: 9.5 MG/DL (ref 8.5–10.4)
CHLORIDE SERPL-SCNC: 105 MMOL/L (ref 97–107)
CHOLEST SERPL-MCNC: 194 MG/DL (ref 133–200)
CHOLEST/HDLC SERPL: 4.2 RATIO
CO2 SERPL-SCNC: 25 MMOL/L (ref 24–31)
COLOR SPUN FLD: YELLOW
CREAT SERPL-MCNC: 0.8 MG/DL (ref 0.4–1.6)
DEPRECATED RDW RBC AUTO: 41.8 FL (ref 37–54)
DIFFERENTIAL METHOD BLD: ABNORMAL
EOSINOPHIL # BLD AUTO: 0.9 K/UL (ref 0–0.45)
EOSINOPHIL NFR BLD: 9.9 % (ref 0–3)
ERYTHROCYTE [DISTWIDTH] IN BLOOD BY AUTOMATED COUNT: 13.4 % (ref 11.7–15)
FASTING STATUS PATIENT QL REPORTED: ABNORMAL
GFR SERPL CREATININE-BSD FRML MDRD: 104 ML/MIN/1.73 M2
GLOBULIN SER-MCNC: 2.9 G/DL (ref 1.9–3.7)
GLUCOSE SERPL-MCNC: 95 MG/DL (ref 65–99)
HCT VFR BLD AUTO: 42 % (ref 36–44)
HDLC SERPL-MCNC: 46 MG/DL
HGB BLD-MCNC: 13.7 GM/DL (ref 12–15)
IMM GRANULOCYTES # BLD AUTO: 0.03 K/UL (ref 0–0.1)
INSULIN SERPL-ACNC: NORMAL U[IU]/ML
LDLC SERPL CALC-MCNC: 123 MG/DL (ref 65–130)
LYMPHOCYTES # BLD AUTO: 3.08 K/UL (ref 1.2–3.2)
LYMPHOCYTES NFR BLD MANUAL: 33.8 % (ref 20–40)
MCH RBC QN AUTO: 28 PG (ref 26–34)
MCHC RBC AUTO-ENTMCNC: 32.6 % (ref 31–37)
MCV RBC AUTO: 85.9 FL (ref 80–100)
MONOCYTES # BLD AUTO: 0.59 K/UL (ref 0–0.8)
MONOCYTES NFR BLD MANUAL: 6.5 % (ref 0–8)
NEUTROPHILS # BLD AUTO: 4.43 K/UL
NEUTROPHILS # BLD AUTO: 4.43 K/UL (ref 1.8–7.7)
NEUTROPHILS.IMMATURE NFR BLD: 0.3 % (ref 0–1)
NEUTS SEG NFR BLD: 48.6 % (ref 50–70)
NRBC BLD-RTO: 0 /100 WBC
PLATELET # BLD AUTO: 385 K/UL (ref 150–450)
PMV BLD AUTO: 10.1 CU (ref 7–12.6)
POTASSIUM SERPL-SCNC: 4.7 MMOL/L (ref 3.4–5.1)
PROT SERPL-MCNC: 7.3 G/DL (ref 5.9–7.9)
RBC # BLD AUTO: 4.89 M/UL (ref 4–4.9)
SODIUM SERPL-SCNC: 141 MMOL/L (ref 133–145)
TRIGL SERPL-MCNC: 123 MG/DL (ref 40–150)
TSH SERPL DL<=0.05 MIU/L-ACNC: 0.75 MIU/L (ref 0.27–4.2)
WBC # BLD AUTO: 9.1 K/UL (ref 4.5–11)

## 2023-10-11 PROBLEM — N92.6 IRREGULAR MENSTRUAL CYCLE: Status: ACTIVE | Noted: 2023-10-11

## 2023-10-11 PROBLEM — K59.00 CONSTIPATION: Status: ACTIVE | Noted: 2023-10-11

## 2023-10-11 PROBLEM — B96.89 BACTERIAL VAGINOSIS: Status: ACTIVE | Noted: 2023-10-11

## 2023-10-11 PROBLEM — F32.A DEPRESSION: Status: ACTIVE | Noted: 2023-10-11

## 2023-10-11 PROBLEM — R89.8 EOSINOPHIL COUNT RAISED: Status: ACTIVE | Noted: 2023-10-11

## 2023-10-11 PROBLEM — E22.1 HYPERPROLACTINEMIA (MULTI): Status: ACTIVE | Noted: 2023-10-11

## 2023-10-11 PROBLEM — M65.4 DE QUERVAIN'S TENOSYNOVITIS, RIGHT: Status: ACTIVE | Noted: 2023-10-11

## 2023-10-11 PROBLEM — E04.9 ENLARGED THYROID: Status: ACTIVE | Noted: 2023-10-11

## 2023-10-11 PROBLEM — N63.10 BREAST MASS, RIGHT: Status: ACTIVE | Noted: 2023-10-11

## 2023-10-11 PROBLEM — J02.9 PHARYNGITIS: Status: ACTIVE | Noted: 2023-10-11

## 2023-10-11 PROBLEM — D72.9 ABNORMAL WHITE BLOOD CELL: Status: ACTIVE | Noted: 2023-10-11

## 2023-10-11 PROBLEM — H53.8 BLURRED VISION: Status: ACTIVE | Noted: 2023-10-11

## 2023-10-11 PROBLEM — E88.819 INSULIN RESISTANCE: Status: ACTIVE | Noted: 2023-10-11

## 2023-10-11 PROBLEM — E34.9 TESTOSTERONE DEFICIENCY: Status: ACTIVE | Noted: 2023-10-11

## 2023-10-11 PROBLEM — R63.5 ABNORMAL WEIGHT GAIN: Status: ACTIVE | Noted: 2023-10-11

## 2023-10-11 PROBLEM — O99.211 OBESITY AFFECTING PREGNANCY IN FIRST TRIMESTER (HHS-HCC): Status: ACTIVE | Noted: 2023-10-11

## 2023-10-11 PROBLEM — E55.9 VITAMIN D DEFICIENCY: Status: ACTIVE | Noted: 2023-10-11

## 2023-10-11 PROBLEM — F32.9 MAJOR DEPRESSIVE DISORDER, SINGLE EPISODE, UNSPECIFIED: Status: ACTIVE | Noted: 2023-10-11

## 2023-10-11 PROBLEM — O16.1 ELEVATED BLOOD PRESSURE AFFECTING PREGNANCY IN FIRST TRIMESTER, ANTEPARTUM (HHS-HCC): Status: ACTIVE | Noted: 2023-10-11

## 2023-10-11 PROBLEM — L70.9 ACNE: Status: ACTIVE | Noted: 2023-10-11

## 2023-10-11 PROBLEM — N76.0 BACTERIAL VAGINOSIS: Status: ACTIVE | Noted: 2023-10-11

## 2023-10-11 PROBLEM — E28.2 POLYCYSTIC OVARIES: Status: ACTIVE | Noted: 2023-10-11

## 2023-10-11 PROBLEM — M26.69 TMJ LOCKING: Status: ACTIVE | Noted: 2023-10-11

## 2023-10-11 PROBLEM — N91.2 AMENORRHEA: Status: ACTIVE | Noted: 2023-10-11

## 2023-10-11 PROBLEM — M41.9 SCOLIOSIS: Status: ACTIVE | Noted: 2023-10-11

## 2023-10-11 PROBLEM — F41.9 ANXIETY: Status: ACTIVE | Noted: 2023-10-11

## 2023-10-11 PROBLEM — S29.9XXA CHEST INJURY: Status: ACTIVE | Noted: 2023-10-11

## 2023-10-11 PROBLEM — Z62.810 HISTORY OF SEXUAL ABUSE IN CHILDHOOD: Status: ACTIVE | Noted: 2023-10-11

## 2023-10-11 PROBLEM — N72 CERVICITIS: Status: ACTIVE | Noted: 2023-10-11

## 2023-10-11 RX ORDER — NORGESTIMATE AND ETHINYL ESTRADIOL 7DAYSX3 28
1 KIT ORAL DAILY
COMMUNITY
Start: 2019-02-12 | End: 2024-06-03 | Stop reason: ALTCHOICE

## 2023-10-11 RX ORDER — IBUPROFEN 600 MG/1
1 TABLET ORAL 3 TIMES DAILY
COMMUNITY
Start: 2019-05-11

## 2023-10-11 RX ORDER — ERGOCALCIFEROL 1.25 MG/1
1 CAPSULE ORAL
COMMUNITY
Start: 2019-05-17

## 2023-10-11 RX ORDER — CYCLOBENZAPRINE HCL 10 MG
1 TABLET ORAL 2 TIMES DAILY PRN
COMMUNITY
Start: 2019-05-11

## 2023-10-11 RX ORDER — CITALOPRAM 20 MG/1
1 TABLET, FILM COATED ORAL DAILY
COMMUNITY
Start: 2019-05-03 | End: 2023-10-12

## 2023-10-11 RX ORDER — FLUTICASONE PROPIONATE 50 MCG
1 SPRAY, SUSPENSION (ML) NASAL NIGHTLY
COMMUNITY
Start: 2021-01-07

## 2023-10-12 ENCOUNTER — PHARMACY VISIT (OUTPATIENT)
Dept: PHARMACY | Facility: CLINIC | Age: 28
End: 2023-10-12
Payer: COMMERCIAL

## 2023-10-12 DIAGNOSIS — F34.1 PERSISTENT DEPRESSIVE DISORDER: Primary | ICD-10-CM

## 2023-10-12 DIAGNOSIS — G47.30 SLEEP APNEA, UNSPECIFIED TYPE: ICD-10-CM

## 2023-10-12 PROCEDURE — RXMED WILLOW AMBULATORY MEDICATION CHARGE

## 2023-10-12 RX ORDER — BUPROPION HYDROCHLORIDE 150 MG/1
150 TABLET, EXTENDED RELEASE ORAL 2 TIMES DAILY
Qty: 60 TABLET | Refills: 1 | Status: SHIPPED | OUTPATIENT
Start: 2023-10-12 | End: 2024-06-03

## 2023-10-16 ENCOUNTER — PHARMACY VISIT (OUTPATIENT)
Dept: PHARMACY | Facility: CLINIC | Age: 28
End: 2023-10-16

## 2023-10-16 PROCEDURE — RXOTC WILLOW AMBULATORY OTC CHARGE

## 2024-03-11 ENCOUNTER — APPOINTMENT (OUTPATIENT)
Dept: SLEEP MEDICINE | Facility: CLINIC | Age: 29
End: 2024-03-11
Payer: COMMERCIAL

## 2024-03-11 NOTE — PROGRESS NOTES
" Patient: Margaret Craig  : 1995 AGE: 28 y.o. SEX:female   MRN: [id number]   Provider: HERBERTH [facility]     Location [facility]   Service Date: 3/11/2024     PCP: Willa Lea DO   Referred by: No ref. provider found            [facility]  NEW PATIENT VISIT NOTE      PATIENT INFORMATION     The patient's referring provider is: No ref. provider found  The patient's Primary Care Provider: Willa Lea DO    HISTORY OF PRESENT ILLNESS     Patient ID: Margaret Craig is a 28 y.o. female who presents to a [facility] for evaluation for {C/C Sleep:32704}    Patient is here {pxarrival:57543}.  The patient has pertinent hx of {brl pertinent hx:01204}.     24        SLEEP HISTORY     SLEEP STUDY HISTORY  {Sleep study results:85724}    SLEEP-WAKE SCHEDULE  Bedtime: {bedtime and waketime:40700}  Subjective sleep latency: ***  Difficulty falling asleep: {Yes/No:33735} due to {reasons for sleep onset insomnia:72438}  Number of awakenings: *** times per night   Falls back asleep in ***  Difficulty staying asleep: {Yes/No:85192} due to {reasons for sleep maintenance insomnia:50389}  Final wake time: {bedtime and waketime:61304}  Out of bed time: {bedtime and waketime:75544}  Shift work: ***  Naps: ***  Feels rested after a nap: {Yes/No:49883}  Average sleep duration (excluding naps): ***    SLEEP ENVIRONMENT  Sleep location: ***  Sleep status: {sleep status:30032}  Preferred sleep position: {Sleep position:44382}  TV in bedroom: ***  Room is dark:  {Yes/No:11234::\"Yes\"}  Room is quiet: {Yes/No:53692::\"Yes\"}  Room is cool: {Yes/No:54632::\"Yes\"}  Bed comfort: good    SLEEP HABITS   Activities before bedtime: ***  Activities in bed: ***  Clockwatching: {Yes/No:77012}  Smoking: ***  ETOH: ***  Marijuana: ***  Caffeine: ***  Sleep aids: ***    WEIGHT: {weight:55204}    Claustrophobia: {Yes/No:62011}    STOP-BANG: " ***  ESS: ***   SAMMY: ***  IRMA-7: ***  PHQ-2:  {Postive or Negative Screen:05454}      REVIEW OF SYSTEMS     REVIEW OF SYSTEMS  Sleep-related ROS:  Night symptoms: {sleep apnea ROS at night:10141}  Morning symptoms: {sleep apnea ROS in mornin}  Daytime symptoms {sleep apnea ROS at daytime:94820}  Hypersomnia / narcolepsy symptoms: {narcolepsy ROS:37183}  Parasomnia symptoms: {parasomnia ROS:26419}  Movements in sleep: {sleep movements:73437}    RLS screen:  {RLSSCREEN:87632:::1}    Naps:   {YES (DEF)/NO:49705}. Naps {ARE/ARENOT:81020:::1} refreshing.  Fatigue: {Desc; symptom severity fatigue:08961}    Review of Systems  {SLEEP ROS:95290:::1}    All other systems have been reviewed and are negative.      ALLERGIES AND MEDICATIONS     ALLERGIES  Not on File    MEDICATIONS  Current Outpatient Medications   Medication Sig Dispense Refill    buPROPion SR (Wellbutrin SR) 150 mg 12 hr tablet Take 1 tablet (150 mg) by mouth 2 times a day. 60 tablet 1    cyclobenzaprine (Flexeril) 10 mg tablet Take 1 tablet (10 mg) by mouth 2 times a day as needed.      ergocalciferol (Vitamin D-2) 1.25 MG (70024 UT) capsule Take 1 capsule (1,250 mcg) by mouth 1 (one) time per week.      fluticasone (Flonase) 50 mcg/actuation nasal spray Administer 1 spray into each nostril once daily at bedtime.      ibuprofen 600 mg tablet Take 1 tablet (600 mg) by mouth 3 times a day.      norgestimate-ethinyl estradioL (Tri-Sprintec, 28,) 0.18/0.215/0.25 mg-35 mcg (28) tablet Take 1 tablet by mouth once daily.       No current facility-administered medications for this visit.         PAST HISTORY     PERTINENT PAST MEDICAL HISTORY: See HPI    PERTINENT PAST SURGICAL HISTORY for Sleep Medicine:  {surgical history pertinent in sleep:33161}    PERTINENT FAMILY HISTORY for Sleep Medicine:  {family history in sleep:01708}    PERTINENT SOCIAL HISTORY:  She  has no history on file for tobacco use, alcohol use, and drug use. She currently lives {living  "status:27512} and {Employment status:60520}    Active Problems, Allergy List, Medication List, and PMH/PSH/FH/Social Hx have been reviewed and reconciled in chart. No significant changes unless documented in the pertinent chart section. Updates made when necessary.       PHYSICAL EXAM     VITAL SIGNS: There were no vitals taken for this visit.    CURRENT WEIGHT: There were no vitals filed for this visit.   BMI: [unfilled]    PREVIOUS WEIGHTS:  Wt Readings from Last 3 Encounters:   23 88.5 kg (195 lb 3.2 oz)   21 79.4 kg (175 lb)   21 80.1 kg (176 lb 9.6 oz)       Physical Exam  Constitutional: Awake, not in distress  Lungs: Clear to auscultation bilateral, no cough noted  Heart: Regular rate and rhythm  Skin: Warm, no rash  Neuro: No tremors, moves all extremities  Psych: alert and oriented to time, place, and person        RESULTS/DATA     No results found for: \"IRON\", \"TRANSFERRIN\", \"IRONSAT\", \"TIBC\", \"FERRITIN\"    Bicarbonate   Date Value Ref Range Status   2023 25 24 - 31 MMOL/L Final       PAP Adherence  {PAP Download reviewed?:68356}      ASSESSMENT/PLAN     Assessment/Plan   Margaret Craig is a 28 y.o. female presents today in [facility] with the following problems:    CURRENT DME: {DME choices:92441}    {JESIKA dx:32284}, {Severity of JESIKA:35589} ({HSAT/PS} AHI: ***/hr)  s/p tonsillectomy  currently on {Auto-CPAP settin}  {Compliance:83663} compliance to PAP therapy, residual AHI at goal, and good control of JESIKA symptoms  - Patient's risk factors for JESIKA: {JESIKA risk factors:51904}  - Current symptoms are: ***  - JESIKA diagnosed by {HSAT/PS} in ***. Reviewed sleep studies {review of sleep study today or previously:49011}. See HPI.  - Retrieved and personally reviewed recent PAP adherence download data today. See HPI.   - {PAP Compliance review:18668}  - Continue current PAP settings.   - Changed PAP settings to     - Due to high pre-test " probability without significant medical comorbidity or possible concomitant sleep disorder, I recommend home sleep apnea testing to evaluate for JESIKA.  - Due to ***, I recommend split-night PSG (Split if AHI>10) to evaluate for JESIKA.  - Due to severity of sleep apnea and significant oxygen desaturation, I recommend PAP titration study to evaluate effective pressure settings to control JESIKA.  - Due to sleep related hypoxemia, patient might have concomitant sleep-related hypoventilation. I recommend  PAP titration study with CO2 monitoring.    - I recommend starting auto-CPAP 5 -15 cm H2O with EPR 3, heated humidification, heated tubings, and mask used in sleep lab. Orders sent to {DME choices:83867}.   - I recommend starting auto-CPAP 5 -15 cm H2O with EPR 3, heated humidification, heated tubings, and mask fitting session. Orders sent to {DME choices:10664}.   - Discussed about 30-day mask guarantee and insurance requirement for PAP compliance follow-up.   - Supportive management: Lose weight. Stay off your back when sleeping. Avoid smoking, alcohol, and sedating medications if applicable. Don't drive when sleepy.    - Discussed JESIKA pathophysiology, diagnostic testing (HST vs PSG), cardiometabolic and neurocognitive sequelae of untreated JESIKA, and treatment options (PAP therapy, oral appliance, surgery, hypoglossal nerve stimulator called INSPIRE, etc).    - I had face-to-face discussion with the patient about the need for PAP therapy to treat sleep apnea. Patient agreed with the plan and verbalized understanding.      {EDS FATIGUE SLEEP DISTURBANCE:54308}  - due to combination of {EDS causes:29935}. {EDS MEDS:78193}  - discussed with patient good sleep hygiene   - Important to get good daily dose of natural sunlight to help wakefulness & energy  - Reduce artificial lighting at night, especially light from screens (i.e. cellphones, TV, tablets)  - Exercise is helpful for your mental and physical health  - Have a  consistent bedtime routine 1 hours prior to your usual bedtime  - If going to take a nap, it should be less than 30 minutes and prior to 3 pm  - Limit alcohol and caffeine use   - Avoidance of marijuana and/or CBD use  - will reevaluate after successful testing and treatment of JESIKA      {Overweight/Obesity:85914}  - BMI today There is no height or weight on file to calculate BMI.   - most recent Bicarb WNL, low suspicion for obesity hypoventilation syndrome  - no significant weight changes noted or reported  - Encouraged patient to lose weight with diet and exercise.   - Weight loss can help in the long term treatment of JESIKA.  - Declined weight loss management assistance consult  - Defer management to PCP  - will do preop risk evaluation once patient comes back with good PAP compliance    HYPERTENSION  - BP today ***  - well controlled with medication, denies any issues with management   - denies any headache, blurry vision, chest pain, palpitation, dizziness, lightheadedness, or syncopal episodes  - encouraged daily exercise with healthy diet for BP and JESIKA management  - on meds per PCP  - Defer management to PCP      SMOKING STATUS screen  - {never or former smoker:32808}     All of patient's questions were answered. She verbalizes understanding and agreement with my assessment and plan.

## 2024-06-03 ENCOUNTER — OFFICE VISIT (OUTPATIENT)
Dept: PRIMARY CARE | Facility: CLINIC | Age: 29
End: 2024-06-03
Payer: COMMERCIAL

## 2024-06-03 VITALS
DIASTOLIC BLOOD PRESSURE: 62 MMHG | HEART RATE: 69 BPM | HEIGHT: 65 IN | BODY MASS INDEX: 34.66 KG/M2 | OXYGEN SATURATION: 98 % | WEIGHT: 208 LBS | SYSTOLIC BLOOD PRESSURE: 114 MMHG | RESPIRATION RATE: 17 BRPM

## 2024-06-03 DIAGNOSIS — R73.03 PRE-DIABETES: ICD-10-CM

## 2024-06-03 DIAGNOSIS — E88.819 INSULIN RESISTANCE: ICD-10-CM

## 2024-06-03 DIAGNOSIS — N92.6 IRREGULAR MENSTRUAL CYCLE: ICD-10-CM

## 2024-06-03 DIAGNOSIS — G47.26 SHIFT WORK SLEEP DISORDER: ICD-10-CM

## 2024-06-03 LAB
CHOLEST SERPL-MCNC: 170 MG/DL (ref 133–200)
CHOLEST/HDLC SERPL: 4.1 {RATIO}
FSH SERPL-ACNC: 4.4 IU/L
HDLC SERPL-MCNC: 41 MG/DL
INSULIN SERPL-ACNC: 35 UIU/ML (ref 3–25)
LDLC SERPL CALC-MCNC: 79 MG/DL (ref 65–130)
LH SERPL-ACNC: 6.4 IU/L
TRIGL SERPL-MCNC: 248 MG/DL (ref 40–150)
TSH SERPL DL<=0.05 MIU/L-ACNC: 3.21 MIU/L (ref 0.27–4.2)

## 2024-06-03 PROCEDURE — 84443 ASSAY THYROID STIM HORMONE: CPT | Performed by: NURSE PRACTITIONER

## 2024-06-03 PROCEDURE — 36415 COLL VENOUS BLD VENIPUNCTURE: CPT | Performed by: NURSE PRACTITIONER

## 2024-06-03 PROCEDURE — 83001 ASSAY OF GONADOTROPIN (FSH): CPT | Mod: WESLAB | Performed by: NURSE PRACTITIONER

## 2024-06-03 PROCEDURE — 1036F TOBACCO NON-USER: CPT | Performed by: NURSE PRACTITIONER

## 2024-06-03 PROCEDURE — 80061 LIPID PANEL: CPT | Performed by: NURSE PRACTITIONER

## 2024-06-03 PROCEDURE — RXMED WILLOW AMBULATORY MEDICATION CHARGE

## 2024-06-03 PROCEDURE — 3008F BODY MASS INDEX DOCD: CPT | Performed by: NURSE PRACTITIONER

## 2024-06-03 PROCEDURE — 83002 ASSAY OF GONADOTROPIN (LH): CPT | Mod: WESLAB | Performed by: NURSE PRACTITIONER

## 2024-06-03 PROCEDURE — 99214 OFFICE O/P EST MOD 30 MIN: CPT | Performed by: NURSE PRACTITIONER

## 2024-06-03 PROCEDURE — 83525 ASSAY OF INSULIN: CPT | Mod: WESLAB | Performed by: NURSE PRACTITIONER

## 2024-06-03 RX ORDER — DULAGLUTIDE 0.75 MG/.5ML
0.75 INJECTION, SOLUTION SUBCUTANEOUS
Qty: 2 ML | Refills: 2 | Status: SHIPPED | OUTPATIENT
Start: 2024-06-03

## 2024-06-03 RX ORDER — DEXTROAMPHETAMINE SACCHARATE, AMPHETAMINE ASPARTATE, DEXTROAMPHETAMINE SULFATE AND AMPHETAMINE SULFATE 2.5; 2.5; 2.5; 2.5 MG/1; MG/1; MG/1; MG/1
10 TABLET ORAL 2 TIMES DAILY
Qty: 60 TABLET | Refills: 0 | Status: SHIPPED | OUTPATIENT
Start: 2024-06-03 | End: 2024-07-04

## 2024-06-03 ASSESSMENT — ENCOUNTER SYMPTOMS
NEUROLOGICAL NEGATIVE: 1
MUSCULOSKELETAL NEGATIVE: 1
RESPIRATORY NEGATIVE: 1
GASTROINTESTINAL NEGATIVE: 1
FATIGUE: 1
CARDIOVASCULAR NEGATIVE: 1

## 2024-06-03 ASSESSMENT — PATIENT HEALTH QUESTIONNAIRE - PHQ9
1. LITTLE INTEREST OR PLEASURE IN DOING THINGS: NOT AT ALL
SUM OF ALL RESPONSES TO PHQ9 QUESTIONS 1 AND 2: 0
2. FEELING DOWN, DEPRESSED OR HOPELESS: NOT AT ALL

## 2024-06-03 ASSESSMENT — PAIN SCALES - GENERAL: PAINLEVEL: 0-NO PAIN

## 2024-06-03 NOTE — PROGRESS NOTES
"Subjective   Patient ID: Margaret Craig is a 29 y.o. female who presents for Weight Check (Patient here for weight gain, swelling in legs, missed periods, and possible varicose veins). Weight 175 2021 , leg swelling , low fatigue working nights     HPI   Stopped wellbutrin due to suscial thoughts feels like it related more seasonal , feeling better  Discussed inuslin resistance   Review of Systems   Constitutional:  Positive for fatigue.   HENT: Negative.     Respiratory: Negative.     Cardiovascular: Negative.    Gastrointestinal: Negative.    Genitourinary: Negative.    Musculoskeletal: Negative.    Neurological: Negative.    Hematological:         Varicose veins        Objective   /62 (BP Location: Left arm, Patient Position: Sitting, BP Cuff Size: Adult)   Pulse 69   Resp 17   Ht 1.651 m (5' 5\")   Wt 94.3 kg (208 lb)   LMP 05/18/2024   SpO2 98%   BMI 34.61 kg/m²     Physical Exam  Constitutional:       Appearance: Normal appearance.   HENT:      Head: Normocephalic and atraumatic.      Right Ear: Tympanic membrane and ear canal normal.      Left Ear: Tympanic membrane and ear canal normal.      Nose: Nose normal.      Mouth/Throat:      Mouth: Mucous membranes are moist.      Pharynx: Oropharynx is clear.   Eyes:      Conjunctiva/sclera: Conjunctivae normal.      Pupils: Pupils are equal, round, and reactive to light.   Cardiovascular:      Rate and Rhythm: Normal rate and regular rhythm.      Pulses: Normal pulses.      Heart sounds: Normal heart sounds. No murmur heard.  Pulmonary:      Effort: Pulmonary effort is normal.      Breath sounds: Normal breath sounds.   Abdominal:      General: There is no distension.      Palpations: Abdomen is soft.      Tenderness: There is no abdominal tenderness.   Musculoskeletal:         General: Normal range of motion.      Cervical back: Normal range of motion and neck supple.   Lymphadenopathy:      Cervical: No cervical adenopathy.   Skin:     General: Skin " is warm and dry.      Capillary Refill: Capillary refill takes less than 2 seconds.   Neurological:      General: No focal deficit present.      Mental Status: She is alert.   Psychiatric:         Mood and Affect: Mood normal.         Behavior: Behavior normal.         Assessment/Plan

## 2024-06-04 ENCOUNTER — PHARMACY VISIT (OUTPATIENT)
Dept: PHARMACY | Facility: CLINIC | Age: 29
End: 2024-06-04
Payer: COMMERCIAL

## 2024-06-14 ENCOUNTER — TELEPHONE (OUTPATIENT)
Dept: PRIMARY CARE | Facility: CLINIC | Age: 29
End: 2024-06-14
Payer: COMMERCIAL

## 2024-06-14 NOTE — TELEPHONE ENCOUNTER
Called pt and L/M that she can try metformin it is the only medication that her insurance will cover so let us know if you want an rx sent in for this.

## 2024-06-14 NOTE — TELEPHONE ENCOUNTER
Pt called back and advised she can not take the metformin she tried before and it did not agree with her.

## 2024-06-20 ENCOUNTER — HOSPITAL ENCOUNTER (EMERGENCY)
Facility: HOSPITAL | Age: 29
Discharge: HOME | End: 2024-06-20
Attending: EMERGENCY MEDICINE
Payer: COMMERCIAL

## 2024-06-20 ENCOUNTER — APPOINTMENT (OUTPATIENT)
Dept: CARDIOLOGY | Facility: HOSPITAL | Age: 29
End: 2024-06-20
Payer: COMMERCIAL

## 2024-06-20 VITALS
RESPIRATION RATE: 18 BRPM | HEART RATE: 77 BPM | DIASTOLIC BLOOD PRESSURE: 71 MMHG | BODY MASS INDEX: 35 KG/M2 | HEIGHT: 64 IN | SYSTOLIC BLOOD PRESSURE: 131 MMHG | TEMPERATURE: 98.2 F | OXYGEN SATURATION: 99 % | WEIGHT: 205 LBS

## 2024-06-20 DIAGNOSIS — R42 VERTIGO: Primary | ICD-10-CM

## 2024-06-20 LAB
ALBUMIN SERPL-MCNC: 4.1 G/DL (ref 3.5–5)
ALP BLD-CCNC: 67 U/L (ref 35–125)
ALT SERPL-CCNC: 16 U/L (ref 5–40)
ANION GAP SERPL CALC-SCNC: 9 MMOL/L
APPEARANCE UR: ABNORMAL
AST SERPL-CCNC: 12 U/L (ref 5–40)
BILIRUB SERPL-MCNC: 0.6 MG/DL (ref 0.1–1.2)
BILIRUB UR STRIP.AUTO-MCNC: NEGATIVE MG/DL
BUN SERPL-MCNC: 10 MG/DL (ref 8–25)
CALCIUM SERPL-MCNC: 8.9 MG/DL (ref 8.5–10.4)
CHLORIDE SERPL-SCNC: 105 MMOL/L (ref 97–107)
CO2 SERPL-SCNC: 24 MMOL/L (ref 24–31)
COLOR UR: ABNORMAL
CREAT SERPL-MCNC: 0.7 MG/DL (ref 0.4–1.6)
EGFRCR SERPLBLD CKD-EPI 2021: >90 ML/MIN/1.73M*2
ERYTHROCYTE [DISTWIDTH] IN BLOOD BY AUTOMATED COUNT: 13.9 % (ref 11.5–14.5)
GLUCOSE SERPL-MCNC: 115 MG/DL (ref 65–99)
GLUCOSE UR STRIP.AUTO-MCNC: NORMAL MG/DL
HCG UR QL IA.RAPID: NEGATIVE
HCT VFR BLD AUTO: 39.9 % (ref 36–46)
HGB BLD-MCNC: 13.1 G/DL (ref 12–16)
KETONES UR STRIP.AUTO-MCNC: NEGATIVE MG/DL
LEUKOCYTE ESTERASE UR QL STRIP.AUTO: ABNORMAL
MCH RBC QN AUTO: 28.5 PG (ref 26–34)
MCHC RBC AUTO-ENTMCNC: 32.8 G/DL (ref 32–36)
MCV RBC AUTO: 87 FL (ref 80–100)
MUCOUS THREADS #/AREA URNS AUTO: ABNORMAL /LPF
NITRITE UR QL STRIP.AUTO: NEGATIVE
NRBC BLD-RTO: 0 /100 WBCS (ref 0–0)
PH UR STRIP.AUTO: 6.5 [PH]
PLATELET # BLD AUTO: 342 X10*3/UL (ref 150–450)
POTASSIUM SERPL-SCNC: 3.8 MMOL/L (ref 3.4–5.1)
PROT SERPL-MCNC: 7.4 G/DL (ref 5.9–7.9)
PROT UR STRIP.AUTO-MCNC: ABNORMAL MG/DL
RBC # BLD AUTO: 4.6 X10*6/UL (ref 4–5.2)
RBC # UR STRIP.AUTO: ABNORMAL /UL
RBC #/AREA URNS AUTO: >20 /HPF
SODIUM SERPL-SCNC: 138 MMOL/L (ref 133–145)
SP GR UR STRIP.AUTO: 1.01
SQUAMOUS #/AREA URNS AUTO: ABNORMAL /HPF
TROPONIN T SERPL-MCNC: <6 NG/L
TSH SERPL DL<=0.05 MIU/L-ACNC: 1.51 MIU/L (ref 0.27–4.2)
UROBILINOGEN UR STRIP.AUTO-MCNC: NORMAL MG/DL
WBC # BLD AUTO: 8.7 X10*3/UL (ref 4.4–11.3)
WBC #/AREA URNS AUTO: ABNORMAL /HPF

## 2024-06-20 PROCEDURE — 84443 ASSAY THYROID STIM HORMONE: CPT | Performed by: EMERGENCY MEDICINE

## 2024-06-20 PROCEDURE — 93005 ELECTROCARDIOGRAM TRACING: CPT

## 2024-06-20 PROCEDURE — 2500000004 HC RX 250 GENERAL PHARMACY W/ HCPCS (ALT 636 FOR OP/ED): Performed by: EMERGENCY MEDICINE

## 2024-06-20 PROCEDURE — 81001 URINALYSIS AUTO W/SCOPE: CPT | Performed by: EMERGENCY MEDICINE

## 2024-06-20 PROCEDURE — 81025 URINE PREGNANCY TEST: CPT | Performed by: EMERGENCY MEDICINE

## 2024-06-20 PROCEDURE — 85027 COMPLETE CBC AUTOMATED: CPT | Performed by: EMERGENCY MEDICINE

## 2024-06-20 PROCEDURE — 36415 COLL VENOUS BLD VENIPUNCTURE: CPT | Performed by: EMERGENCY MEDICINE

## 2024-06-20 PROCEDURE — 99283 EMERGENCY DEPT VISIT LOW MDM: CPT

## 2024-06-20 PROCEDURE — 84484 ASSAY OF TROPONIN QUANT: CPT | Performed by: EMERGENCY MEDICINE

## 2024-06-20 PROCEDURE — 84075 ASSAY ALKALINE PHOSPHATASE: CPT | Performed by: EMERGENCY MEDICINE

## 2024-06-20 RX ADMIN — SODIUM CHLORIDE 1000 ML: 900 INJECTION, SOLUTION INTRAVENOUS at 06:55

## 2024-06-20 ASSESSMENT — PAIN SCALES - GENERAL
PAINLEVEL_OUTOF10: 0 - NO PAIN
PAINLEVEL_OUTOF10: 0 - NO PAIN

## 2024-06-20 ASSESSMENT — PAIN - FUNCTIONAL ASSESSMENT: PAIN_FUNCTIONAL_ASSESSMENT: 0-10

## 2024-06-20 NOTE — ED TRIAGE NOTES
Pt presents to the ED with c/o spells of dizziness, increased heart rate and increased blood pressure. Pt states that dizziness has been on and off for a couple of months now.

## 2024-06-20 NOTE — ED PROVIDER NOTES
HPI   Chief Complaint   Patient presents with    Dizziness     Pt presents to the ED with c/o spells of dizziness, increased heart rate and increased blood pressure. Pt states that dizziness has been on and off for a couple of months now.       21-year-old female presented today with complaints of 2 to 3 months of sporadic positional dizziness.  Patient describes symptoms specifically is beginning to feel like the room is moving or she is falling sideways.  The episodes come and go and typically involve activities such as work bending to change catheter or inspect an IV.  Very occasionally she has the problems when she is sitting and turns or moves.  For the most part the symptoms sal with rest.  She has attributed the problems thus far to not eating and drinking well enough she had several of these episodes did not work and she was asked to come to the emergency department for evaluation.  She denies any other changes to sensation, strength, speech.  She is had some lab evaluations done by her primary care physician and response to the symptoms and did have an elevation in her triglyceride levels but no other abnormalities were found.  She denies any current symptoms.                          Michi Coma Scale Score: 15         NIH Stroke Scale: 0             Patient History   Past Medical History:   Diagnosis Date    Dysuria 06/08/2015    Dysuria    Pain in unspecified wrist 07/08/2014    Pain, wrist joint    Personal history of other benign neoplasm     History of other benign neoplasm    Personal history of other diseases of the respiratory system 02/04/2014    History of acute pharyngitis    Personal history of urinary (tract) infections 06/08/2015    History of urinary tract infection    Unspecified injury of unspecified wrist, hand and finger(s), initial encounter 02/01/2014    Wrist injury     Past Surgical History:   Procedure Laterality Date    OTHER SURGICAL HISTORY  05/11/2019    Breast biopsy      Family History   Problem Relation Name Age of Onset    Heart disease Mother      No Known Problems Father      No Known Problems Sister      No Known Problems Brother      Breast cancer Other maternal side      Social History     Tobacco Use    Smoking status: Never    Smokeless tobacco: Never   Vaping Use    Vaping status: Never Used   Substance Use Topics    Alcohol use: Yes     Comment: social    Drug use: Never       Physical Exam   ED Triage Vitals [06/20/24 0527]   Temperature Heart Rate Respirations BP   36.8 °C (98.2 °F) 77 18 131/71      Pulse Ox Temp Source Heart Rate Source Patient Position   99 % Oral Monitor Lying      BP Location FiO2 (%)     Left arm --       Physical Exam  Vitals and nursing note reviewed.   Constitutional:       General: She is not in acute distress.     Appearance: She is well-developed.   HENT:      Head: Normocephalic and atraumatic.   Eyes:      Conjunctiva/sclera: Conjunctivae normal.   Cardiovascular:      Rate and Rhythm: Normal rate and regular rhythm.      Heart sounds: No murmur heard.  Pulmonary:      Effort: Pulmonary effort is normal. No respiratory distress.      Breath sounds: Normal breath sounds.   Abdominal:      Palpations: Abdomen is soft.      Tenderness: There is no abdominal tenderness.   Musculoskeletal:         General: No swelling.      Cervical back: Neck supple.   Skin:     General: Skin is warm and dry.      Capillary Refill: Capillary refill takes less than 2 seconds.   Neurological:      General: No focal deficit present.      Mental Status: She is alert.      Cranial Nerves: No cranial nerve deficit.      Comments: Patient has an NIH of 0.  She has no postural instability.  She has a negative test of skew.   Psychiatric:         Mood and Affect: Mood normal.         ED Course & MDM   Diagnoses as of 06/20/24 0809   Vertigo       Medical Decision Making  NIH of 0.  At this time she does not have current symptoms.  She relays a several month history  of episodic and sporadic dizziness in relation to position changes remission of BPV.  Patient has never had a formal evaluation with ENT.  I do not see overt findings of dehydration that would suggest this is a cause of her symptoms.  She has had stable vital signs and regular heart rate throughout her stay.  Discussed the findings and lab works in detail with the patient.  She has good primary care follow-up and I will make referral to ENT.  She is off work until Saturday and she can call clinic and obtain appointment in the next week to be seen.  Discussed strict return precautions patient discharged home.    Amount and/or Complexity of Data Reviewed  Labs: ordered. Decision-making details documented in ED Course.     Details: Troponin negative.  TSH within normal limits.  CMP shows mild elevation of blood glucose otherwise unremarkable.  UA shows hematuria though she is actively on her cycle and there are no other signs of infection.  hCG is negative.  CBC is unremarkable including a normal hemoglobin.  ECG/medicine tests: ordered and independent interpretation performed. Decision-making details documented in ED Course.     Details: Sinus bradycardia heart rate of 59.  Normal GA.  Normal QRS.  Normal QTc.  No ST segment changes.  No previous EKG for comparison.        Procedure  Procedures     Charly Akers MD  06/20/24 4961

## 2024-06-23 LAB
ATRIAL RATE: 59 BPM
P AXIS: 39 DEGREES
P OFFSET: 183 MS
P ONSET: 132 MS
PR INTERVAL: 172 MS
Q ONSET: 218 MS
QRS COUNT: 10 BEATS
QRS DURATION: 86 MS
QT INTERVAL: 414 MS
QTC CALCULATION(BAZETT): 409 MS
QTC FREDERICIA: 411 MS
R AXIS: 55 DEGREES
T AXIS: 25 DEGREES
T OFFSET: 425 MS
VENTRICULAR RATE: 59 BPM

## 2024-06-27 ENCOUNTER — TELEPHONE (OUTPATIENT)
Dept: PRIMARY CARE | Facility: CLINIC | Age: 29
End: 2024-06-27
Payer: COMMERCIAL

## 2024-06-27 ENCOUNTER — PATIENT MESSAGE (OUTPATIENT)
Dept: PRIMARY CARE | Facility: CLINIC | Age: 29
End: 2024-06-27
Payer: COMMERCIAL

## 2024-06-27 DIAGNOSIS — E88.819 INSULIN RESISTANCE: Primary | ICD-10-CM

## 2024-08-09 PROCEDURE — RXMED WILLOW AMBULATORY MEDICATION CHARGE

## 2024-08-10 ENCOUNTER — PHARMACY VISIT (OUTPATIENT)
Dept: PHARMACY | Facility: CLINIC | Age: 29
End: 2024-08-10
Payer: COMMERCIAL

## 2024-09-12 PROCEDURE — RXMED WILLOW AMBULATORY MEDICATION CHARGE

## 2024-09-18 ENCOUNTER — PHARMACY VISIT (OUTPATIENT)
Dept: PHARMACY | Facility: CLINIC | Age: 29
End: 2024-09-18
Payer: COMMERCIAL

## 2024-09-25 ENCOUNTER — LAB (OUTPATIENT)
Dept: LAB | Facility: LAB | Age: 29
End: 2024-09-25
Payer: COMMERCIAL

## 2024-09-26 LAB — COTININE UR QL SCN: NEGATIVE

## 2025-09-02 ENCOUNTER — OFFICE VISIT (OUTPATIENT)
Dept: PRIMARY CARE | Facility: CLINIC | Age: 30
End: 2025-09-02
Payer: COMMERCIAL

## 2025-09-02 VITALS
WEIGHT: 194 LBS | SYSTOLIC BLOOD PRESSURE: 130 MMHG | OXYGEN SATURATION: 97 % | HEART RATE: 50 BPM | BODY MASS INDEX: 33.3 KG/M2 | DIASTOLIC BLOOD PRESSURE: 76 MMHG

## 2025-09-02 DIAGNOSIS — Z80.3 FAMILY HISTORY OF BREAST CANCER IN FIRST DEGREE RELATIVE: ICD-10-CM

## 2025-09-02 DIAGNOSIS — N63.10 MASS OF RIGHT BREAST, UNSPECIFIED QUADRANT: ICD-10-CM

## 2025-09-02 DIAGNOSIS — E88.819 INSULIN RESISTANCE: Primary | ICD-10-CM

## 2025-09-02 DIAGNOSIS — G47.26 SHIFT WORK SLEEP DISORDER: ICD-10-CM

## 2025-09-02 DIAGNOSIS — E78.5 HYPERLIPIDEMIA, UNSPECIFIED HYPERLIPIDEMIA TYPE: ICD-10-CM

## 2025-09-02 DIAGNOSIS — E04.9 ENLARGED THYROID: ICD-10-CM

## 2025-09-02 DIAGNOSIS — Z12.31 ENCOUNTER FOR SCREENING MAMMOGRAM FOR MALIGNANT NEOPLASM OF BREAST: ICD-10-CM

## 2025-09-02 DIAGNOSIS — Z00.00 ROUTINE MEDICAL EXAM: ICD-10-CM

## 2025-09-02 DIAGNOSIS — I10 PRIMARY HYPERTENSION: ICD-10-CM

## 2025-09-02 DIAGNOSIS — I83.11 VARICOSE VEINS OF BOTH LOWER EXTREMITIES WITH INFLAMMATION: ICD-10-CM

## 2025-09-02 DIAGNOSIS — Z12.4 SCREENING FOR CERVICAL CANCER: ICD-10-CM

## 2025-09-02 DIAGNOSIS — I83.12 VARICOSE VEINS OF BOTH LOWER EXTREMITIES WITH INFLAMMATION: ICD-10-CM

## 2025-09-02 DIAGNOSIS — E55.9 VITAMIN D DEFICIENCY: ICD-10-CM

## 2025-09-02 PROCEDURE — 3078F DIAST BP <80 MM HG: CPT | Performed by: NURSE PRACTITIONER

## 2025-09-02 PROCEDURE — RXMED WILLOW AMBULATORY MEDICATION CHARGE

## 2025-09-02 PROCEDURE — 3075F SYST BP GE 130 - 139MM HG: CPT | Performed by: NURSE PRACTITIONER

## 2025-09-02 PROCEDURE — 1036F TOBACCO NON-USER: CPT | Performed by: NURSE PRACTITIONER

## 2025-09-02 PROCEDURE — 87491 CHLMYD TRACH DNA AMP PROBE: CPT | Performed by: NURSE PRACTITIONER

## 2025-09-02 PROCEDURE — 99395 PREV VISIT EST AGE 18-39: CPT | Performed by: NURSE PRACTITIONER

## 2025-09-02 RX ORDER — ERGOCALCIFEROL 1.25 MG/1
1.25 CAPSULE ORAL
Qty: 4 CAPSULE | Refills: 11 | Status: SHIPPED | OUTPATIENT
Start: 2025-09-02 | End: 2026-09-02

## 2025-09-02 RX ORDER — LIRAGLUTIDE 6 MG/ML
INJECTION SUBCUTANEOUS
Qty: 6 ML | Refills: 2 | Status: SHIPPED | OUTPATIENT
Start: 2025-09-02 | End: 2025-10-09

## 2025-09-02 RX ORDER — COMPRES.STOCKING,THIGH,REG,MED
1 EACH MISCELLANEOUS DAILY
Qty: 1 EACH | Refills: 1 | Status: SHIPPED | OUTPATIENT
Start: 2025-09-02

## 2025-09-02 RX ORDER — DEXTROAMPHETAMINE SACCHARATE, AMPHETAMINE ASPARTATE, DEXTROAMPHETAMINE SULFATE AND AMPHETAMINE SULFATE 2.5; 2.5; 2.5; 2.5 MG/1; MG/1; MG/1; MG/1
10 TABLET ORAL 2 TIMES DAILY
Qty: 60 TABLET | Refills: 0 | Status: SHIPPED | OUTPATIENT
Start: 2025-09-02 | End: 2025-10-03

## 2025-09-02 ASSESSMENT — ENCOUNTER SYMPTOMS
GASTROINTESTINAL NEGATIVE: 1
UNEXPECTED WEIGHT CHANGE: 1
NEUROLOGICAL NEGATIVE: 1
MUSCULOSKELETAL NEGATIVE: 1
RESPIRATORY NEGATIVE: 1
OCCASIONAL FEELINGS OF UNSTEADINESS: 0
CARDIOVASCULAR NEGATIVE: 1
LOSS OF SENSATION IN FEET: 0
DEPRESSION: 0

## 2025-09-02 ASSESSMENT — PATIENT HEALTH QUESTIONNAIRE - PHQ9
SUM OF ALL RESPONSES TO PHQ9 QUESTIONS 1 AND 2: 0
1. LITTLE INTEREST OR PLEASURE IN DOING THINGS: NOT AT ALL
2. FEELING DOWN, DEPRESSED OR HOPELESS: NOT AT ALL

## 2025-09-02 ASSESSMENT — PAIN SCALES - GENERAL: PAINLEVEL_OUTOF10: 0-NO PAIN

## 2025-09-03 ENCOUNTER — HOSPITAL ENCOUNTER (OUTPATIENT)
Dept: RADIOLOGY | Facility: HOSPITAL | Age: 30
Discharge: HOME | End: 2025-09-03
Payer: COMMERCIAL

## 2025-09-03 ENCOUNTER — PHARMACY VISIT (OUTPATIENT)
Dept: PHARMACY | Facility: CLINIC | Age: 30
End: 2025-09-03
Payer: COMMERCIAL

## 2025-09-03 DIAGNOSIS — Z12.31 ENCOUNTER FOR SCREENING MAMMOGRAM FOR MALIGNANT NEOPLASM OF BREAST: ICD-10-CM

## 2025-09-03 LAB
ALBUMIN SERPL-MCNC: 4.2 G/DL (ref 3.6–5.1)
ALP SERPL-CCNC: 52 U/L (ref 31–125)
ALT SERPL-CCNC: 14 U/L (ref 6–29)
ANION GAP SERPL CALCULATED.4IONS-SCNC: 7 MMOL/L (CALC) (ref 7–17)
AST SERPL-CCNC: 11 U/L (ref 10–30)
BASOPHILS # BLD AUTO: 57 CELLS/UL (ref 0–200)
BASOPHILS NFR BLD AUTO: 0.8 %
BILIRUB SERPL-MCNC: 0.9 MG/DL (ref 0.2–1.2)
BUN SERPL-MCNC: 11 MG/DL (ref 7–25)
C TRACH RRNA SPEC QL NAA+PROBE: NEGATIVE
CALCIUM SERPL-MCNC: 8.7 MG/DL (ref 8.6–10.2)
CHLORIDE SERPL-SCNC: 108 MMOL/L (ref 98–110)
CHOLEST SERPL-MCNC: 158 MG/DL
CHOLEST/HDLC SERPL: 3.2 (CALC)
CO2 SERPL-SCNC: 22 MMOL/L (ref 20–32)
CREAT SERPL-MCNC: 0.57 MG/DL (ref 0.5–0.96)
EGFRCR SERPLBLD CKD-EPI 2021: 126 ML/MIN/1.73M2
EOSINOPHIL # BLD AUTO: 611 CELLS/UL (ref 15–500)
EOSINOPHIL NFR BLD AUTO: 8.6 %
ERYTHROCYTE [DISTWIDTH] IN BLOOD BY AUTOMATED COUNT: 14.7 % (ref 11–15)
EST. AVERAGE GLUCOSE BLD GHB EST-MCNC: 108 MG/DL
EST. AVERAGE GLUCOSE BLD GHB EST-SCNC: 6 MMOL/L
GLUCOSE SERPL-MCNC: 82 MG/DL (ref 65–99)
HBA1C MFR BLD: 5.4 %
HCT VFR BLD AUTO: 41.7 % (ref 35–45)
HDLC SERPL-MCNC: 50 MG/DL
HGB BLD-MCNC: 13.5 G/DL (ref 11.7–15.5)
LDLC SERPL CALC-MCNC: 91 MG/DL (CALC)
LYMPHOCYTES # BLD AUTO: 2201 CELLS/UL (ref 850–3900)
LYMPHOCYTES NFR BLD AUTO: 31 %
MCH RBC QN AUTO: 29.2 PG (ref 27–33)
MCHC RBC AUTO-ENTMCNC: 32.4 G/DL (ref 32–36)
MCV RBC AUTO: 90.1 FL (ref 80–100)
MONOCYTES # BLD AUTO: 476 CELLS/UL (ref 200–950)
MONOCYTES NFR BLD AUTO: 6.7 %
N GONORRHOEA DNA SPEC QL PROBE+SIG AMP: NEGATIVE
NEUTROPHILS # BLD AUTO: 3756 CELLS/UL (ref 1500–7800)
NEUTROPHILS NFR BLD AUTO: 52.9 %
NONHDLC SERPL-MCNC: 108 MG/DL (CALC)
PLATELET # BLD AUTO: 366 THOUSAND/UL (ref 140–400)
PMV BLD REES-ECKER: 10.5 FL (ref 7.5–12.5)
POTASSIUM SERPL-SCNC: 4.3 MMOL/L (ref 3.5–5.3)
PROT SERPL-MCNC: 6.9 G/DL (ref 6.1–8.1)
RBC # BLD AUTO: 4.63 MILLION/UL (ref 3.8–5.1)
SODIUM SERPL-SCNC: 137 MMOL/L (ref 135–146)
TRIGL SERPL-MCNC: 82 MG/DL
TSH SERPL-ACNC: 1.33 MIU/L
WBC # BLD AUTO: 7.1 THOUSAND/UL (ref 3.8–10.8)